# Patient Record
Sex: MALE | Race: WHITE | NOT HISPANIC OR LATINO | Employment: OTHER | URBAN - METROPOLITAN AREA
[De-identification: names, ages, dates, MRNs, and addresses within clinical notes are randomized per-mention and may not be internally consistent; named-entity substitution may affect disease eponyms.]

---

## 2020-07-31 DIAGNOSIS — R10.9 PAIN, ABDOMINAL, NONSPECIFIC: Primary | ICD-10-CM

## 2020-08-07 ENCOUNTER — HOSPITAL ENCOUNTER (OUTPATIENT)
Dept: RADIOLOGY | Facility: HOSPITAL | Age: 60
Discharge: HOME | End: 2020-08-07
Attending: FAMILY MEDICINE
Payer: COMMERCIAL

## 2020-08-07 DIAGNOSIS — R10.9 PAIN, ABDOMINAL, NONSPECIFIC: ICD-10-CM

## 2020-08-07 PROCEDURE — 76700 US EXAM ABDOM COMPLETE: CPT | Mod: TC,PO

## 2020-08-07 PROCEDURE — 74019 RADEX ABDOMEN 2 VIEWS: CPT | Mod: TC,PO

## 2020-08-18 ENCOUNTER — LAB VISIT (OUTPATIENT)
Dept: LAB | Facility: HOSPITAL | Age: 60
End: 2020-08-18
Attending: INTERNAL MEDICINE
Payer: COMMERCIAL

## 2020-08-18 DIAGNOSIS — K80.20 BILIARY CALCULUS: Primary | ICD-10-CM

## 2020-08-18 LAB
ALBUMIN SERPL BCP-MCNC: 4.4 G/DL (ref 3.5–5.2)
ALP SERPL-CCNC: 44 U/L (ref 55–135)
ALT SERPL W/O P-5'-P-CCNC: 38 U/L (ref 10–44)
ANION GAP SERPL CALC-SCNC: 11 MMOL/L (ref 8–16)
AST SERPL-CCNC: 31 U/L (ref 10–40)
BILIRUB SERPL-MCNC: 1 MG/DL (ref 0.1–1)
BUN SERPL-MCNC: 13 MG/DL (ref 8–23)
CALCIUM SERPL-MCNC: 10 MG/DL (ref 8.7–10.5)
CHLORIDE SERPL-SCNC: 97 MMOL/L (ref 95–110)
CO2 SERPL-SCNC: 30 MMOL/L (ref 23–29)
CREAT SERPL-MCNC: 0.9 MG/DL (ref 0.5–1.4)
EST. GFR  (AFRICAN AMERICAN): >60 ML/MIN/1.73 M^2
EST. GFR  (NON AFRICAN AMERICAN): >60 ML/MIN/1.73 M^2
GLUCOSE SERPL-MCNC: 108 MG/DL (ref 70–110)
POTASSIUM SERPL-SCNC: 3.6 MMOL/L (ref 3.5–5.1)
PROT SERPL-MCNC: 7.2 G/DL (ref 6–8.4)
SODIUM SERPL-SCNC: 138 MMOL/L (ref 136–145)

## 2020-08-18 PROCEDURE — 80053 COMPREHEN METABOLIC PANEL: CPT

## 2020-08-18 PROCEDURE — 36415 COLL VENOUS BLD VENIPUNCTURE: CPT

## 2021-03-23 DIAGNOSIS — R07.89 OTHER CHEST PAIN: ICD-10-CM

## 2021-03-23 DIAGNOSIS — I83.91 ASYMPTOMATIC VARICOSE VEINS OF RIGHT LOWER EXTREMITY: ICD-10-CM

## 2021-03-23 DIAGNOSIS — R10.9 STOMACH ACHE: Primary | ICD-10-CM

## 2021-04-07 ENCOUNTER — HOSPITAL ENCOUNTER (OUTPATIENT)
Dept: RADIOLOGY | Facility: HOSPITAL | Age: 61
Discharge: HOME | End: 2021-04-07
Attending: NURSE PRACTITIONER
Payer: COMMERCIAL

## 2021-04-07 DIAGNOSIS — R07.89 OTHER CHEST PAIN: ICD-10-CM

## 2021-04-07 DIAGNOSIS — I83.91 ASYMPTOMATIC VARICOSE VEINS OF RIGHT LOWER EXTREMITY: ICD-10-CM

## 2021-04-07 DIAGNOSIS — R10.9 STOMACH ACHE: ICD-10-CM

## 2021-04-07 PROCEDURE — 76700 US EXAM ABDOM COMPLETE: CPT | Mod: TC,PO

## 2021-04-07 PROCEDURE — 71046 X-RAY EXAM CHEST 2 VIEWS: CPT | Mod: TC,PO

## 2021-04-07 PROCEDURE — 93971 EXTREMITY STUDY: CPT | Mod: TC,PO,RT

## 2021-05-06 ENCOUNTER — LAB VISIT (OUTPATIENT)
Dept: LAB | Facility: HOSPITAL | Age: 61
End: 2021-05-06
Attending: INTERNAL MEDICINE
Payer: COMMERCIAL

## 2021-05-06 DIAGNOSIS — A09 INFECTIOUS COLITIS, ENTERITIS, AND GASTROENTERITIS: Primary | ICD-10-CM

## 2021-05-06 DIAGNOSIS — K58.0 IRRITABLE BOWEL SYNDROME WITH DIARRHEA: ICD-10-CM

## 2021-05-06 DIAGNOSIS — R10.9 ABDOMINAL PAIN OF UNKNOWN CAUSE: Primary | ICD-10-CM

## 2021-05-06 LAB
C DIFF GDH STL QL: NEGATIVE
C DIFF TOX A+B STL QL IA: NEGATIVE
RV AG STL QL IA.RAPID: NEGATIVE

## 2021-05-06 PROCEDURE — 87045 FECES CULTURE AEROBIC BACT: CPT | Performed by: INTERNAL MEDICINE

## 2021-05-06 PROCEDURE — 87425 ROTAVIRUS AG IA: CPT | Performed by: INTERNAL MEDICINE

## 2021-05-06 PROCEDURE — 36415 COLL VENOUS BLD VENIPUNCTURE: CPT | Performed by: INTERNAL MEDICINE

## 2021-05-06 PROCEDURE — 87798 DETECT AGENT NOS DNA AMP: CPT | Performed by: INTERNAL MEDICINE

## 2021-05-06 PROCEDURE — 87046 STOOL CULTR AEROBIC BACT EA: CPT | Performed by: INTERNAL MEDICINE

## 2021-05-06 PROCEDURE — 87324 CLOSTRIDIUM AG IA: CPT | Performed by: INTERNAL MEDICINE

## 2021-05-06 PROCEDURE — 30000890 LABCORP MISCELLANEOUS TEST: Performed by: INTERNAL MEDICINE

## 2021-05-06 PROCEDURE — 87328 CRYPTOSPORIDIUM AG IA: CPT | Performed by: INTERNAL MEDICINE

## 2021-05-06 PROCEDURE — 82656 EL-1 FECAL QUAL/SEMIQ: CPT | Performed by: INTERNAL MEDICINE

## 2021-05-06 PROCEDURE — 87449 NOS EACH ORGANISM AG IA: CPT | Performed by: INTERNAL MEDICINE

## 2021-05-06 PROCEDURE — 87209 SMEAR COMPLEX STAIN: CPT | Performed by: INTERNAL MEDICINE

## 2021-05-08 LAB
CRYPTOSP AG STL QL IA: NEGATIVE
STOOL CULTURE: NORMAL
STOOL CULTURE: NORMAL

## 2021-05-10 LAB — ELASTASE PANC STL-MCNT: 234 UG ELAST./G

## 2021-05-14 LAB
LABCORP MISC TEST CODE: NORMAL
LABCORP MISC TEST NAME: NORMAL
LABCORP MISCELLANEOUS TEST: NORMAL

## 2021-05-17 ENCOUNTER — HOSPITAL ENCOUNTER (OUTPATIENT)
Dept: RADIOLOGY | Facility: HOSPITAL | Age: 61
Discharge: HOME | End: 2021-05-17
Attending: INTERNAL MEDICINE
Payer: COMMERCIAL

## 2021-05-17 DIAGNOSIS — R10.9 ABDOMINAL PAIN OF UNKNOWN CAUSE: ICD-10-CM

## 2021-05-17 LAB
CREAT SERPL-MCNC: 0.8 MG/DL (ref 0.5–1.4)
SAMPLE: NORMAL

## 2021-05-17 PROCEDURE — 74177 CT ABD & PELVIS W/CONTRAST: CPT | Mod: TC,PO

## 2021-05-17 PROCEDURE — 25500020 PHARM REV CODE 255: Mod: PO | Performed by: INTERNAL MEDICINE

## 2021-05-17 RX ADMIN — IOHEXOL 100 ML: 350 INJECTION, SOLUTION INTRAVENOUS at 10:05

## 2023-01-09 LAB — PSA: 4.7

## 2023-07-24 ENCOUNTER — OFFICE VISIT (OUTPATIENT)
Dept: FAMILY MEDICINE | Facility: CLINIC | Age: 63
End: 2023-07-24
Payer: COMMERCIAL

## 2023-07-24 VITALS
OXYGEN SATURATION: 97 % | TEMPERATURE: 98 F | SYSTOLIC BLOOD PRESSURE: 132 MMHG | HEART RATE: 65 BPM | WEIGHT: 295.44 LBS | DIASTOLIC BLOOD PRESSURE: 82 MMHG | BODY MASS INDEX: 41.36 KG/M2 | RESPIRATION RATE: 18 BRPM | HEIGHT: 71 IN

## 2023-07-24 DIAGNOSIS — M25.561 ACUTE PAIN OF RIGHT KNEE: ICD-10-CM

## 2023-07-24 DIAGNOSIS — I10 ESSENTIAL (PRIMARY) HYPERTENSION: Primary | ICD-10-CM

## 2023-07-24 DIAGNOSIS — Z13.1 SCREENING FOR DIABETES MELLITUS: ICD-10-CM

## 2023-07-24 DIAGNOSIS — N40.0 BENIGN PROSTATIC HYPERPLASIA, UNSPECIFIED WHETHER LOWER URINARY TRACT SYMPTOMS PRESENT: ICD-10-CM

## 2023-07-24 DIAGNOSIS — Z13.220 SCREENING FOR HYPERLIPIDEMIA: ICD-10-CM

## 2023-07-24 DIAGNOSIS — R53.83 FATIGUE, UNSPECIFIED TYPE: ICD-10-CM

## 2023-07-24 PROCEDURE — 1159F MED LIST DOCD IN RCRD: CPT | Mod: CPTII,S$GLB,,

## 2023-07-24 PROCEDURE — 3079F PR MOST RECENT DIASTOLIC BLOOD PRESSURE 80-89 MM HG: ICD-10-PCS | Mod: CPTII,S$GLB,,

## 2023-07-24 PROCEDURE — 99204 PR OFFICE/OUTPT VISIT, NEW, LEVL IV, 45-59 MIN: ICD-10-PCS | Mod: S$GLB,,,

## 2023-07-24 PROCEDURE — 1159F PR MEDICATION LIST DOCUMENTED IN MEDICAL RECORD: ICD-10-PCS | Mod: CPTII,S$GLB,,

## 2023-07-24 PROCEDURE — 3075F SYST BP GE 130 - 139MM HG: CPT | Mod: CPTII,S$GLB,,

## 2023-07-24 PROCEDURE — 3075F PR MOST RECENT SYSTOLIC BLOOD PRESS GE 130-139MM HG: ICD-10-PCS | Mod: CPTII,S$GLB,,

## 2023-07-24 PROCEDURE — 3008F BODY MASS INDEX DOCD: CPT | Mod: CPTII,S$GLB,,

## 2023-07-24 PROCEDURE — 3008F PR BODY MASS INDEX (BMI) DOCUMENTED: ICD-10-PCS | Mod: CPTII,S$GLB,,

## 2023-07-24 PROCEDURE — 99204 OFFICE O/P NEW MOD 45 MIN: CPT | Mod: S$GLB,,,

## 2023-07-24 PROCEDURE — 3079F DIAST BP 80-89 MM HG: CPT | Mod: CPTII,S$GLB,,

## 2023-07-24 RX ORDER — TAMSULOSIN HYDROCHLORIDE 0.4 MG/1
1 CAPSULE ORAL
COMMUNITY
Start: 2023-05-18

## 2023-07-24 RX ORDER — AMLODIPINE BESYLATE 10 MG/1
10 TABLET ORAL
COMMUNITY
Start: 2023-03-08

## 2023-07-24 RX ORDER — HYDROCHLOROTHIAZIDE 25 MG/1
25 TABLET ORAL
COMMUNITY
Start: 2023-06-29

## 2023-07-24 NOTE — Clinical Note
Please request colonoscopy results from Dr. Sneed.  Also request copy of PSA results from Dr. Pemberton

## 2023-07-24 NOTE — PROGRESS NOTES
Subjective:       Patient ID: Grupo Givens is a 62 y.o. male.    Chief Complaint: Establish Care and Knee Pain    Grupo Givens is a 62 y.o. male patient who is new to me that presents to clinic to establish care. He complaints of right knee pain after sitting in a chair for a long period of time.  Pain feels like it is behind his knee and has a deep ache.  If he is in the car for a couple of hours he has significant pain in his right knee.  He is taking over-the-counter medications with not much relief.  He has no known injury.    PMHx  Hypertension:  With fair control taking amlodipine 10 mg and hydrochlorothiazide 25 mg daily.  He is compliant with medications.  Denies chest pain or shortness a breath.  BPH:  Taking Flomax daily.  Followed by Dr. Li.  He states he is had an elevated PSA that will be repeated by Dr. Li in a couple of months.    Surgical history   Tonsillectomy and adenoidectomy    Family history   Mother () heart disease (MI age 73), depression   Father (alive) valvular heart disease, Alzheimer's disease  Maternal grandmother () heart disease  Maternal grandfather () cancer, alcohol abuse, heart disease  Paternal grandmother () Alzheimer's disease    Social  Tobacco:  Former smoker, half pack per day for 20 years.  Ten pack years.  ETOH:  Patient drinks approximately 6 glasses of wine per night, 7 days a week  Smokes marijuana  , 3 children  Career:  Owner of amusement company    Review of patient's allergies indicates:   Allergen Reactions    Lisinopril Other (See Comments)     Social Determinants of Health     Tobacco Use: Medium Risk    Smoking Tobacco Use: Former    Smokeless Tobacco Use: Never    Passive Exposure: Not on file   Alcohol Use: Not on file   Financial Resource Strain: Not on file   Food Insecurity: Not on file   Transportation Needs: Not on file   Physical Activity: Not on file   Stress: Not on file   Social  Connections: Not on file   Housing Stability: Not on file   Depression: Low Risk     Last PHQ-4: Flowsheet Data: 0      Past Medical History:   Diagnosis Date    BPH (benign prostatic hyperplasia)     Essential (primary) hypertension       Past Surgical History:   Procedure Laterality Date    ADENOIDECTOMY      TONSILLECTOMY        Social History     Socioeconomic History    Marital status:      Spouse name: Brandie    Number of children: 3         Current Outpatient Medications:     amLODIPine (NORVASC) 10 MG tablet, Take 10 mg by mouth., Disp: , Rfl:     hydroCHLOROthiazide (HYDRODIURIL) 25 MG tablet, Take 25 mg by mouth., Disp: , Rfl:     tamsulosin (FLOMAX) 0.4 mg Cap, Take 1 capsule by mouth., Disp: , Rfl:     Lab Results   Component Value Date    WBC 5.48 05/27/2004    HGB 16.3 05/27/2004    HCT 49.1 05/27/2004     05/27/2004    CHOL 189 05/27/2004    TRIG 48 05/27/2004    HDL 82.0 (H) 05/27/2004    ALT 38 08/18/2020    AST 31 08/18/2020     08/18/2020    K 3.6 08/18/2020    CL 97 08/18/2020    CREATININE 0.9 08/18/2020    BUN 13 08/18/2020    CO2 30 (H) 08/18/2020    TSH 4.4 (H) 05/27/2004       Review of Systems   Constitutional:  Negative for chills and fever.   Respiratory:  Negative for chest tightness and shortness of breath.    Cardiovascular:  Negative for chest pain, palpitations and leg swelling.   Genitourinary:  Negative for decreased urine volume, dysuria, frequency and urgency.   Musculoskeletal:  Positive for arthralgias.     Objective:      Physical Exam  Constitutional:       Appearance: Normal appearance.   HENT:      Head: Normocephalic and atraumatic.      Right Ear: Tympanic membrane normal.      Left Ear: Tympanic membrane normal.      Nose: Nose normal.      Mouth/Throat:      Mouth: Mucous membranes are moist.   Eyes:      Pupils: Pupils are equal, round, and reactive to light.   Cardiovascular:      Rate and Rhythm: Normal rate and regular rhythm.      Pulses: Normal  pulses.           Radial pulses are 2+ on the right side and 2+ on the left side.        Dorsalis pedis pulses are 2+ on the right side and 2+ on the left side.      Heart sounds: Normal heart sounds, S1 normal and S2 normal.   Pulmonary:      Effort: Pulmonary effort is normal.      Breath sounds: Normal breath sounds.   Abdominal:      General: Abdomen is flat. Bowel sounds are normal.      Palpations: Abdomen is soft.      Tenderness: There is no abdominal tenderness.   Musculoskeletal:         General: Normal range of motion.      Cervical back: Normal range of motion and neck supple.      Right lower leg: No edema.      Left lower leg: No edema.   Lymphadenopathy:      Cervical: No cervical adenopathy.   Skin:     General: Skin is warm and dry.   Neurological:      Mental Status: He is alert and oriented to person, place, and time.   Psychiatric:         Attention and Perception: Attention normal.         Mood and Affect: Mood normal.         Speech: Speech normal.         Behavior: Behavior normal. Behavior is cooperative.         Thought Content: Thought content normal.         Judgment: Judgment normal.       Assessment:       1. Essential (primary) hypertension    2. Benign prostatic hyperplasia, unspecified whether lower urinary tract symptoms present    3. Acute pain of right knee    4. Screening for hyperlipidemia    5. Fatigue, unspecified type    6. Screening for diabetes mellitus        Plan:       Grupo was seen today for establish care and knee pain.    Diagnoses and all orders for this visit:    Essential (primary) hypertension  -     CBC Auto Differential; Future  -     Comprehensive Metabolic Panel; Future  -     CBC Auto Differential  -     Comprehensive Metabolic Panel    Benign prostatic hyperplasia, unspecified whether lower urinary tract symptoms present    Acute pain of right knee  -     X-Ray Knee 3 View Right; Future    Screening for hyperlipidemia  -     Lipid Panel; Future  -     Lipid  Panel    Fatigue, unspecified type  -     TSH; Future  -     TSH    Screening for diabetes mellitus  -     Hemoglobin A1C; Future  -     Hemoglobin A1C    Have x-ray of right knee today.  Depending on results of x-ray consider steroid treatment.  Also consider referral to ortho.   Have fasting labs.    Hypertension with fair control, continue current medications.  BPH:  Follow-up with Dr. Li as recommended.  We will request copy of recent PSA.  We will request colonoscopy results.  Routine follow-up in 6 months or sooner if needed.

## 2023-07-25 ENCOUNTER — HOSPITAL ENCOUNTER (OUTPATIENT)
Dept: RADIOLOGY | Facility: HOSPITAL | Age: 63
Discharge: HOME | End: 2023-07-25
Payer: COMMERCIAL

## 2023-07-25 ENCOUNTER — PATIENT OUTREACH (OUTPATIENT)
Dept: ADMINISTRATIVE | Facility: HOSPITAL | Age: 63
End: 2023-07-25
Payer: COMMERCIAL

## 2023-07-25 DIAGNOSIS — M25.561 ACUTE PAIN OF RIGHT KNEE: ICD-10-CM

## 2023-07-25 PROCEDURE — 73562 X-RAY EXAM OF KNEE 3: CPT | Mod: TC,PO,RT

## 2023-07-25 NOTE — LETTER
AUTHORIZATION FOR RELEASE OF   CONFIDENTIAL INFORMATION    Dear Dr Cortes,    We are seeing Grupo Givens, date of birth 1960, in the clinic at [facility]. Dr Melendez is the patient's PCP. Grupo Givens has an outstanding lab/procedure at the time we reviewed his chart. In order to help keep his health information updated, he has authorized us to request the following medical record(s):        (  )  MAMMOGRAM                                      ( X )  COLONOSCOPY      (  )  PAP SMEAR                                          (  )  OUTSIDE LAB RESULTS     (  )  DEXA SCAN                                          (  )  EYE EXAM            (  )  FOOT EXAM                                          (  )  ENTIRE RECORD     (  )  OUTSIDE IMMUNIZATIONS                 (  )  _______________         Please fax records to [facility], Dr Melendez, at [phone]     Thanks so much and have a great day!    [place] LPN [facility]  [address] [place] [address]  - [phone]  - [phone]            Patient Name: Grupo Givens  : 1960  Patient Phone #: [phone]

## 2023-07-26 ENCOUNTER — TELEPHONE (OUTPATIENT)
Dept: FAMILY MEDICINE | Facility: CLINIC | Age: 63
End: 2023-07-26
Payer: COMMERCIAL

## 2023-07-26 LAB
ALBUMIN SERPL-MCNC: 4.4 G/DL (ref 3.9–4.9)
ALBUMIN/GLOB SERPL: 1.8 {RATIO} (ref 1.2–2.2)
ALP SERPL-CCNC: 47 IU/L (ref 44–121)
ALT SERPL-CCNC: 16 IU/L (ref 0–44)
AST SERPL-CCNC: 20 IU/L (ref 0–40)
BASOPHILS # BLD AUTO: 0 X10E3/UL (ref 0–0.2)
BASOPHILS NFR BLD AUTO: 1 %
BILIRUB SERPL-MCNC: 0.6 MG/DL (ref 0–1.2)
BUN SERPL-MCNC: 11 MG/DL (ref 8–27)
BUN/CREAT SERPL: 15 (ref 10–24)
CALCIUM SERPL-MCNC: 10.1 MG/DL (ref 8.6–10.2)
CHLORIDE SERPL-SCNC: 96 MMOL/L (ref 96–106)
CHOLEST SERPL-MCNC: 188 MG/DL (ref 100–199)
CO2 SERPL-SCNC: 33 MMOL/L (ref 20–29)
CREAT SERPL-MCNC: 0.74 MG/DL (ref 0.76–1.27)
EOSINOPHIL # BLD AUTO: 0.2 X10E3/UL (ref 0–0.4)
EOSINOPHIL NFR BLD AUTO: 4 %
ERYTHROCYTE [DISTWIDTH] IN BLOOD BY AUTOMATED COUNT: 12.6 % (ref 11.6–15.4)
EST. GFR  (NO RACE VARIABLE): 101 ML/MIN/1.73
GLOBULIN SER CALC-MCNC: 2.4 G/DL (ref 1.5–4.5)
GLUCOSE SERPL-MCNC: 106 MG/DL (ref 70–99)
HBA1C MFR BLD: 5.2 % (ref 4.8–5.6)
HCT VFR BLD AUTO: 44.3 % (ref 37.5–51)
HDLC SERPL-MCNC: 73 MG/DL
HGB BLD-MCNC: 15.2 G/DL (ref 13–17.7)
IMM GRANULOCYTES # BLD AUTO: 0 X10E3/UL (ref 0–0.1)
IMM GRANULOCYTES NFR BLD AUTO: 1 %
LDLC SERPL CALC-MCNC: 105 MG/DL (ref 0–99)
LYMPHOCYTES # BLD AUTO: 1.2 X10E3/UL (ref 0.7–3.1)
LYMPHOCYTES NFR BLD AUTO: 22 %
MCH RBC QN AUTO: 33.5 PG (ref 26.6–33)
MCHC RBC AUTO-ENTMCNC: 34.3 G/DL (ref 31.5–35.7)
MCV RBC AUTO: 98 FL (ref 79–97)
MONOCYTES # BLD AUTO: 0.7 X10E3/UL (ref 0.1–0.9)
MONOCYTES NFR BLD AUTO: 12 %
NEUTROPHILS # BLD AUTO: 3.4 X10E3/UL (ref 1.4–7)
NEUTROPHILS NFR BLD AUTO: 60 %
PLATELET # BLD AUTO: 188 X10E3/UL (ref 150–450)
POTASSIUM SERPL-SCNC: 3.8 MMOL/L (ref 3.5–5.2)
PROT SERPL-MCNC: 6.8 G/DL (ref 6–8.5)
RBC # BLD AUTO: 4.54 X10E6/UL (ref 4.14–5.8)
SODIUM SERPL-SCNC: 136 MMOL/L (ref 134–144)
TRIGL SERPL-MCNC: 49 MG/DL (ref 0–149)
TSH SERPL DL<=0.005 MIU/L-ACNC: 3.33 UIU/ML (ref 0.45–4.5)
VLDLC SERPL CALC-MCNC: 10 MG/DL (ref 5–40)
WBC # BLD AUTO: 5.5 X10E3/UL (ref 3.4–10.8)

## 2023-07-28 ENCOUNTER — TELEPHONE (OUTPATIENT)
Dept: FAMILY MEDICINE | Facility: CLINIC | Age: 63
End: 2023-07-28
Payer: COMMERCIAL

## 2023-07-28 NOTE — TELEPHONE ENCOUNTER
----- Message from Justine Drummond NP sent at 7/28/2023  2:16 PM CDT -----  Glucose is a little elevated. Remaining labs are good. Follow up as recommended.

## 2023-08-25 ENCOUNTER — OFFICE VISIT (OUTPATIENT)
Dept: ORTHOPEDICS | Facility: CLINIC | Age: 63
End: 2023-08-25
Payer: MEDICARE

## 2023-08-25 VITALS — WEIGHT: 295.44 LBS | HEIGHT: 71 IN | BODY MASS INDEX: 41.36 KG/M2

## 2023-08-25 DIAGNOSIS — M17.0 PRIMARY OSTEOARTHRITIS OF BOTH KNEES: Primary | ICD-10-CM

## 2023-08-25 PROCEDURE — 99999 PR PBB SHADOW E&M-EST. PATIENT-LVL II: CPT | Mod: PBBFAC,HCNC,, | Performed by: ORTHOPAEDIC SURGERY

## 2023-08-25 PROCEDURE — 99999 PR PBB SHADOW E&M-EST. PATIENT-LVL II: ICD-10-PCS | Mod: PBBFAC,HCNC,, | Performed by: ORTHOPAEDIC SURGERY

## 2023-08-25 PROCEDURE — 1159F PR MEDICATION LIST DOCUMENTED IN MEDICAL RECORD: ICD-10-PCS | Mod: HCNC,CPTII,S$GLB, | Performed by: ORTHOPAEDIC SURGERY

## 2023-08-25 PROCEDURE — 1160F PR REVIEW ALL MEDS BY PRESCRIBER/CLIN PHARMACIST DOCUMENTED: ICD-10-PCS | Mod: HCNC,CPTII,S$GLB, | Performed by: ORTHOPAEDIC SURGERY

## 2023-08-25 PROCEDURE — 1159F MED LIST DOCD IN RCRD: CPT | Mod: HCNC,CPTII,S$GLB, | Performed by: ORTHOPAEDIC SURGERY

## 2023-08-25 PROCEDURE — 99204 OFFICE O/P NEW MOD 45 MIN: CPT | Mod: HCNC,S$GLB,, | Performed by: ORTHOPAEDIC SURGERY

## 2023-08-25 PROCEDURE — 99204 PR OFFICE/OUTPT VISIT, NEW, LEVL IV, 45-59 MIN: ICD-10-PCS | Mod: HCNC,S$GLB,, | Performed by: ORTHOPAEDIC SURGERY

## 2023-08-25 PROCEDURE — 3044F HG A1C LEVEL LT 7.0%: CPT | Mod: HCNC,CPTII,S$GLB, | Performed by: ORTHOPAEDIC SURGERY

## 2023-08-25 PROCEDURE — 3044F PR MOST RECENT HEMOGLOBIN A1C LEVEL <7.0%: ICD-10-PCS | Mod: HCNC,CPTII,S$GLB, | Performed by: ORTHOPAEDIC SURGERY

## 2023-08-25 PROCEDURE — 3008F PR BODY MASS INDEX (BMI) DOCUMENTED: ICD-10-PCS | Mod: HCNC,CPTII,S$GLB, | Performed by: ORTHOPAEDIC SURGERY

## 2023-08-25 PROCEDURE — 1160F RVW MEDS BY RX/DR IN RCRD: CPT | Mod: HCNC,CPTII,S$GLB, | Performed by: ORTHOPAEDIC SURGERY

## 2023-08-25 PROCEDURE — 3008F BODY MASS INDEX DOCD: CPT | Mod: HCNC,CPTII,S$GLB, | Performed by: ORTHOPAEDIC SURGERY

## 2023-08-25 RX ORDER — MELOXICAM 15 MG/1
15 TABLET ORAL DAILY
Qty: 30 TABLET | Refills: 1 | Status: SHIPPED | OUTPATIENT
Start: 2023-08-25

## 2023-08-25 NOTE — PROGRESS NOTES
Subjective:      Patient ID: Grupo Givens is a 62 y.o. male.    Chief Complaint: No chief complaint on file.    HPI  62-year-old male with a history of right knee pain approximately 4 months ago was playing darts and was doing lots of stooping squatting bending picking the darts up there was no particular trauma but the next day had some tightness and soreness in his knee.  More recently simply lifted his leg to step over some drop cords and had increased pain in his knee.  It has a bit with overnight rest and elevation.  He is had no specific treatment for this otherwise.  ROS      Objective:    Ortho Exam     Constitutional:   Patient is alert  and oriented in no acute distress  HEENT:  normocephalic atraumatic; PERRL EOMI  Neck:  Supple without adenopathy  Cardiovascular:  Normal rate and rhythm  Pulmonary:  Normal respiratory effort normal chest wall expansion  Abdominal:  Nonprotuberant nondistended  Musculoskeletal:  He has a mildly antalgic gait  He has significant varicosities of both lower extremities he has a trace effusion primarily  Posterior joint line tenderness he has difficulty with flexion beyond 90° today.  He has good active extension  Neurological:  No focal defect; cranial nerves 2-12 grossly intact  Psychiatric/behavioral:  Mood and behavior normal      X-Ray Knee 3 View Right  Reason: PAIN, NO INJURY    FINDINGS:  3 views of right knee show no acute fracture or dislocation. Mild osteoarthrosis affects the patellofemoral compartment. No medial or lateral compartment joint space narrowing. Dictated ossicle lies posteriorly within the right knee, nonspecific. Negative for joint effusion. Mild arterial vascular calcifications are present.    IMPRESSION:  Mild patellofemoral compartment osteoarthrosis with no acute abnormality of right knee.    Electronically signed by:  Alex Ulrich MD  7/25/2023 10:54 AM CDT Workstation: [id number]       My Radiographs Findings:    I have personally  reviewed radiographs and concur with above findings    Assessment:       Encounter Diagnosis   Name Primary?    Primary osteoarthritis of both knees Yes         Plan:       I have discussed medical condition and treatment options him at length.  He is content with a trial of conservative treatment to include home rehab exercises ice elevation compressive wrapping and anti-inflammatory medicines.  GI cardiac and renal precautions were discussed.  Follow up me in 4-6 weeks if symptoms fail to improve.        Past Medical History:   Diagnosis Date    BPH (benign prostatic hyperplasia)     Essential (primary) hypertension      Past Surgical History:   Procedure Laterality Date    ADENOIDECTOMY      TONSILLECTOMY           Current Outpatient Medications:     amLODIPine (NORVASC) 10 MG tablet, Take 10 mg by mouth., Disp: , Rfl:     hydroCHLOROthiazide (HYDRODIURIL) 25 MG tablet, Take 25 mg by mouth., Disp: , Rfl:     tamsulosin (FLOMAX) 0.4 mg Cap, Take 1 capsule by mouth., Disp: , Rfl:     Review of patient's allergies indicates:   Allergen Reactions    Lisinopril Other (See Comments)       Family History   Problem Relation Age of Onset    Heart disease Mother 73        MI    Depression Mother     Heart disease Father         valve disease    Alzheimer's disease Father     Heart disease Maternal Grandmother         enlarged heart    Cancer Maternal Grandfather     Alcohol abuse Maternal Grandfather     Heart disease Maternal Grandfather     Alzheimer's disease Paternal Grandmother      Social History     Occupational History    Occupation: Owner of [employer]   Tobacco Use    Smoking status: Former     Current packs/day: 0.00     Average packs/day: 0.5 packs/day for 20.0 years (10.0 ttl pk-yrs)     Types: Cigarettes     Start date: 1980     Quit date: 2000     Years since quittin.2    Smokeless tobacco: Never   Substance and Sexual Activity    Alcohol use: Yes     Alcohol/week: 42.0 standard drinks of  alcohol     Types: 42 Glasses of wine per week    Drug use: Yes     Types: Marijuana    Sexual activity: Yes     Partners: Female

## 2023-12-07 ENCOUNTER — DOCUMENTATION ONLY (OUTPATIENT)
Dept: ORTHOPEDICS | Facility: CLINIC | Age: 63
End: 2023-12-07
Payer: MEDICARE

## 2023-12-07 NOTE — PROGRESS NOTES
[facility] prescription refill request was successfully faxed to [phone].    Batched to medical records.

## 2024-04-18 ENCOUNTER — OFFICE VISIT (OUTPATIENT)
Dept: FAMILY MEDICINE | Facility: CLINIC | Age: 64
End: 2024-04-18
Payer: MEDICARE

## 2024-04-18 VITALS
TEMPERATURE: 98 F | SYSTOLIC BLOOD PRESSURE: 138 MMHG | HEIGHT: 71 IN | OXYGEN SATURATION: 96 % | HEART RATE: 63 BPM | BODY MASS INDEX: 39.62 KG/M2 | WEIGHT: 283 LBS | RESPIRATION RATE: 18 BRPM | DIASTOLIC BLOOD PRESSURE: 86 MMHG

## 2024-04-18 DIAGNOSIS — Z13.6 SCREENING FOR AAA (ABDOMINAL AORTIC ANEURYSM): ICD-10-CM

## 2024-04-18 DIAGNOSIS — Z00.00 PHYSICAL EXAM: ICD-10-CM

## 2024-04-18 DIAGNOSIS — N40.0 BENIGN PROSTATIC HYPERPLASIA, UNSPECIFIED WHETHER LOWER URINARY TRACT SYMPTOMS PRESENT: ICD-10-CM

## 2024-04-18 DIAGNOSIS — K63.5 POLYP OF COLON, UNSPECIFIED PART OF COLON, UNSPECIFIED TYPE: ICD-10-CM

## 2024-04-18 DIAGNOSIS — Z87.891 FORMER SMOKER: ICD-10-CM

## 2024-04-18 DIAGNOSIS — I10 HYPERTENSION, ESSENTIAL: ICD-10-CM

## 2024-04-18 DIAGNOSIS — E66.01 CLASS 3 OBESITY: Primary | ICD-10-CM

## 2024-04-18 DIAGNOSIS — Z90.89 S/P TONSILLECTOMY: ICD-10-CM

## 2024-04-18 DIAGNOSIS — Z86.718 HISTORY OF DVT (DEEP VEIN THROMBOSIS): ICD-10-CM

## 2024-04-18 PROBLEM — E66.813 CLASS 3 OBESITY: Status: ACTIVE | Noted: 2024-04-18

## 2024-04-18 PROCEDURE — 3079F DIAST BP 80-89 MM HG: CPT | Mod: HCNC,CPTII,S$GLB, | Performed by: FAMILY MEDICINE

## 2024-04-18 PROCEDURE — 1126F AMNT PAIN NOTED NONE PRSNT: CPT | Mod: HCNC,CPTII,S$GLB, | Performed by: FAMILY MEDICINE

## 2024-04-18 PROCEDURE — 1160F RVW MEDS BY RX/DR IN RCRD: CPT | Mod: HCNC,CPTII,S$GLB, | Performed by: FAMILY MEDICINE

## 2024-04-18 PROCEDURE — 99397 PER PM REEVAL EST PAT 65+ YR: CPT | Mod: HCNC,S$GLB,, | Performed by: FAMILY MEDICINE

## 2024-04-18 PROCEDURE — 3075F SYST BP GE 130 - 139MM HG: CPT | Mod: HCNC,CPTII,S$GLB, | Performed by: FAMILY MEDICINE

## 2024-04-18 PROCEDURE — 1159F MED LIST DOCD IN RCRD: CPT | Mod: HCNC,CPTII,S$GLB, | Performed by: FAMILY MEDICINE

## 2024-04-18 PROCEDURE — 3288F FALL RISK ASSESSMENT DOCD: CPT | Mod: HCNC,CPTII,S$GLB, | Performed by: FAMILY MEDICINE

## 2024-04-18 PROCEDURE — 99999 PR PBB SHADOW E&M-EST. PATIENT-LVL IV: CPT | Mod: PBBFAC,HCNC,, | Performed by: FAMILY MEDICINE

## 2024-04-18 PROCEDURE — 1101F PT FALLS ASSESS-DOCD LE1/YR: CPT | Mod: HCNC,CPTII,S$GLB, | Performed by: FAMILY MEDICINE

## 2024-04-18 PROCEDURE — 3008F BODY MASS INDEX DOCD: CPT | Mod: HCNC,CPTII,S$GLB, | Performed by: FAMILY MEDICINE

## 2024-04-18 RX ORDER — HYDROCHLOROTHIAZIDE 25 MG/1
25 TABLET ORAL DAILY
Qty: 90 TABLET | Refills: 1 | Status: SHIPPED | OUTPATIENT
Start: 2024-04-18

## 2024-04-18 RX ORDER — AMLODIPINE BESYLATE 10 MG/1
10 TABLET ORAL DAILY
Qty: 90 TABLET | Refills: 1 | Status: SHIPPED | OUTPATIENT
Start: 2024-04-18

## 2024-04-20 PROBLEM — Z86.718 HISTORY OF DVT (DEEP VEIN THROMBOSIS): Status: ACTIVE | Noted: 2024-04-20

## 2024-04-20 PROBLEM — Z90.89 S/P TONSILLECTOMY: Status: ACTIVE | Noted: 2024-04-20

## 2024-04-20 PROBLEM — K63.5 POLYP OF COLON: Status: ACTIVE | Noted: 2024-04-20

## 2024-04-20 PROBLEM — Z87.891 FORMER SMOKER: Status: ACTIVE | Noted: 2024-04-20

## 2024-04-21 NOTE — PROGRESS NOTES
Subjective:       Patient ID: Grupo Givens is a 63 y.o. male.    Chief Complaint: Annual Exam and Establish Care    Here for new patient visit.      Social history quit smoking some 20 years ago.  Alcohol wine daily.  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      Family history mother MI.  Maternal grandfather MI and alcoholism.  Father age 89 healthy other than some dementia.      Immunizations declines all.    Past medical history.  Colon polyps.  Former smoker.  BPH.  Biopsy done no cancer.  PSA is current with  In each min.  5.1.  Hypertension controlled normally.  Out of medications.  BMI of 39.  History of DVT of the right thigh 24 years ago.  Prior tonsillectomy.        Review of Systems   Constitutional: Negative.    HENT: Negative.     Eyes: Negative.    Respiratory: Negative.     Cardiovascular: Negative.    Gastrointestinal: Negative.    Endocrine: Negative.    Genitourinary: Negative.    Musculoskeletal: Negative.    Skin: Negative.    Neurological: Negative.    Hematological: Negative.    Psychiatric/Behavioral: Negative.         Objective:      Physical Exam  Vitals reviewed.   Constitutional:       Appearance: Normal appearance. He is well-developed and normal weight.   HENT:      Head: Normocephalic and atraumatic.      Right Ear: Tympanic membrane normal.      Left Ear: Tympanic membrane normal.      Nose: Nose normal.      Mouth/Throat:      Mouth: Mucous membranes are moist.      Pharynx: No oropharyngeal exudate.   Eyes:      Conjunctiva/sclera: Conjunctivae normal.      Pupils: Pupils are equal, round, and reactive to light.   Neck:      Vascular: No carotid bruit.   Cardiovascular:      Rate and Rhythm: Normal rate and regular rhythm.      Pulses: Normal pulses.      Heart sounds: Normal heart sounds. No murmur heard.     No gallop.   Pulmonary:      Effort: Pulmonary effort is normal.      Breath sounds: Normal breath sounds.   Abdominal:      General: Bowel sounds are normal.      Palpations:  Abdomen is soft. There is no mass.      Tenderness: There is no abdominal tenderness.   Musculoskeletal:         General: Normal range of motion.      Cervical back: Normal range of motion.   Skin:     General: Skin is warm and dry.   Neurological:      General: No focal deficit present.      Mental Status: He is alert and oriented to person, place, and time.   Psychiatric:         Mood and Affect: Mood normal.         Behavior: Behavior normal.         Assessment:       1. Class 3 obesity    2. Physical exam    3. Hypertension, essential    4. Benign prostatic hyperplasia, unspecified whether lower urinary tract symptoms present    5. Polyp of colon, unspecified part of colon, unspecified type    6. Former smoker    7. BMI 39.0-39.9,adult    8. History of DVT (deep vein thrombosis)    9. S/P tonsillectomy    10. Screening for AAA (abdominal aortic aneurysm)        Plan:       Class 3 obesity    Physical exam  -     US AAA Screening; Future; Expected date: 04/18/2024    Hypertension, essential  -     Basic Metabolic Panel; Future; Expected date: 04/18/2024    Benign prostatic hyperplasia, unspecified whether lower urinary tract symptoms present    Polyp of colon, unspecified part of colon, unspecified type    Former smoker    BMI 39.0-39.9,adult    History of DVT (deep vein thrombosis)    S/P tonsillectomy    Screening for AAA (abdominal aortic aneurysm)  -     US AAA Screening; Future; Expected date: 04/18/2024    Other orders  -     amLODIPine (NORVASC) 10 MG tablet; Take 1 tablet (10 mg total) by mouth once daily.  Dispense: 90 tablet; Refill: 1  -     hydroCHLOROthiazide (HYDRODIURIL) 25 MG tablet; Take 1 tablet (25 mg total) by mouth once daily.  Dispense: 90 tablet; Refill: 1    Abdominal aortic aneurysm screening.  Check BMP.  Refill all medications.  Monitor blood pressure closely.

## 2024-04-30 ENCOUNTER — HOSPITAL ENCOUNTER (OUTPATIENT)
Dept: RADIOLOGY | Facility: HOSPITAL | Age: 64
Discharge: HOME | End: 2024-04-30
Attending: FAMILY MEDICINE
Payer: MEDICARE

## 2024-04-30 DIAGNOSIS — Z00.00 PHYSICAL EXAM: ICD-10-CM

## 2024-04-30 DIAGNOSIS — Z13.6 SCREENING FOR AAA (ABDOMINAL AORTIC ANEURYSM): ICD-10-CM

## 2024-04-30 PROCEDURE — 76706 US ABDL AORTA SCREEN AAA: CPT | Mod: TC,PO

## 2024-04-30 PROCEDURE — 76706 US ABDL AORTA SCREEN AAA: CPT | Mod: 26,,, | Performed by: RADIOLOGY

## 2024-05-02 LAB
BUN SERPL-MCNC: 11 MG/DL (ref 8–27)
BUN/CREAT SERPL: 16 (ref 10–24)
CALCIUM SERPL-MCNC: 10.1 MG/DL (ref 8.6–10.2)
CHLORIDE SERPL-SCNC: 96 MMOL/L (ref 96–106)
CO2 SERPL-SCNC: 29 MMOL/L (ref 20–29)
CREAT SERPL-MCNC: 0.68 MG/DL (ref 0.76–1.27)
EST. GFR  (NO RACE VARIABLE): 103 ML/MIN/1.73
GLUCOSE SERPL-MCNC: 109 MG/DL (ref 70–99)
POTASSIUM SERPL-SCNC: 4.1 MMOL/L (ref 3.5–5.2)
SODIUM SERPL-SCNC: 137 MMOL/L (ref 134–144)

## 2024-09-12 RX ORDER — HYDROCHLOROTHIAZIDE 25 MG/1
25 TABLET ORAL
Qty: 90 TABLET | Refills: 2 | Status: SHIPPED | OUTPATIENT
Start: 2024-09-12

## 2024-09-12 RX ORDER — AMLODIPINE BESYLATE 10 MG/1
10 TABLET ORAL
Qty: 90 TABLET | Refills: 2 | Status: SHIPPED | OUTPATIENT
Start: 2024-09-12

## 2024-09-12 NOTE — TELEPHONE ENCOUNTER
Refill Decision Note   Grupo Chon  is requesting a refill authorization.  Brief Assessment and Rationale for Refill:  Approve     Medication Therapy Plan:         Comments:     Note composed:12:28 PM 09/12/2024

## 2024-09-12 NOTE — TELEPHONE ENCOUNTER
No care due was identified.  [facility] Embedded Care Due Messages. Reference number: [id number].   9/12/2024 2:07:29 AM CDT

## 2024-09-26 DIAGNOSIS — Z00.00 ENCOUNTER FOR MEDICARE ANNUAL WELLNESS EXAM: ICD-10-CM

## 2025-02-22 DIAGNOSIS — Z00.00 ENCOUNTER FOR MEDICARE ANNUAL WELLNESS EXAM: ICD-10-CM

## 2025-04-21 ENCOUNTER — OFFICE VISIT (OUTPATIENT)
Dept: FAMILY MEDICINE | Facility: CLINIC | Age: 65
End: 2025-04-21
Payer: MEDICARE

## 2025-04-21 VITALS
BODY MASS INDEX: 40.98 KG/M2 | TEMPERATURE: 99 F | WEIGHT: 292.69 LBS | DIASTOLIC BLOOD PRESSURE: 62 MMHG | OXYGEN SATURATION: 96 % | SYSTOLIC BLOOD PRESSURE: 110 MMHG | HEIGHT: 71 IN | HEART RATE: 72 BPM

## 2025-04-21 DIAGNOSIS — I10 HYPERTENSION, ESSENTIAL: Primary | ICD-10-CM

## 2025-04-21 DIAGNOSIS — R79.9 ABNORMAL FINDING OF BLOOD CHEMISTRY, UNSPECIFIED: ICD-10-CM

## 2025-04-21 DIAGNOSIS — Z86.718 HISTORY OF DVT (DEEP VEIN THROMBOSIS): ICD-10-CM

## 2025-04-21 DIAGNOSIS — Z12.5 PROSTATE CANCER SCREENING: ICD-10-CM

## 2025-04-21 DIAGNOSIS — R60.0 PEDAL EDEMA: ICD-10-CM

## 2025-04-21 DIAGNOSIS — R22.43 LOCALIZED SWELLING OF BOTH LOWER LEGS: ICD-10-CM

## 2025-04-21 DIAGNOSIS — J40 BRONCHITIS: ICD-10-CM

## 2025-04-21 DIAGNOSIS — I87.2 VENOUS INSUFFICIENCY: ICD-10-CM

## 2025-04-21 DIAGNOSIS — D69.2 SENILE PURPURA: ICD-10-CM

## 2025-04-21 PROCEDURE — 99999 PR PBB SHADOW E&M-EST. PATIENT-LVL III: CPT | Mod: PBBFAC,HCNC,, | Performed by: FAMILY MEDICINE

## 2025-04-21 RX ORDER — CLOTRIMAZOLE AND BETAMETHASONE DIPROPIONATE 10; .64 MG/G; MG/G
CREAM TOPICAL 2 TIMES DAILY
COMMUNITY
End: 2025-04-21 | Stop reason: SDUPTHER

## 2025-04-21 RX ORDER — ALBUTEROL SULFATE 90 UG/1
2 INHALANT RESPIRATORY (INHALATION) EVERY 4 HOURS PRN
COMMUNITY
End: 2025-04-21 | Stop reason: SDUPTHER

## 2025-04-21 RX ORDER — AMLODIPINE BESYLATE 5 MG/1
5 TABLET ORAL DAILY
Qty: 30 TABLET | Refills: 0 | Status: CANCELLED | OUTPATIENT
Start: 2025-04-21

## 2025-04-21 RX ORDER — DOXYCYCLINE 100 MG/1
100 CAPSULE ORAL 2 TIMES DAILY
Qty: 20 CAPSULE | Refills: 0 | Status: SHIPPED | OUTPATIENT
Start: 2025-04-21

## 2025-04-21 RX ORDER — CLOTRIMAZOLE AND BETAMETHASONE DIPROPIONATE 10; .64 MG/G; MG/G
CREAM TOPICAL 2 TIMES DAILY
Qty: 60 G | Refills: 0 | Status: SHIPPED | OUTPATIENT
Start: 2025-04-21

## 2025-04-21 RX ORDER — METOPROLOL SUCCINATE 25 MG/1
25 TABLET, EXTENDED RELEASE ORAL DAILY
COMMUNITY
End: 2025-04-21 | Stop reason: SDUPTHER

## 2025-04-21 RX ORDER — METOPROLOL SUCCINATE 25 MG/1
25 TABLET, EXTENDED RELEASE ORAL DAILY
Qty: 30 TABLET | Refills: 0 | Status: SHIPPED | OUTPATIENT
Start: 2025-04-21

## 2025-04-21 RX ORDER — ALBUTEROL SULFATE 90 UG/1
2 INHALANT RESPIRATORY (INHALATION) EVERY 6 HOURS PRN
Qty: 18 G | Refills: 0 | Status: SHIPPED | OUTPATIENT
Start: 2025-04-21

## 2025-04-21 NOTE — PROGRESS NOTES
SCRIBE #1 NOTE: I, Ramin Henriquez, am scribing for, and in the presence of,  Fady Melendez III, MD. I have scribed the entire note.     Subjective:       Patient ID: Grupo Givens is a 64 y.o. male.    Chief Complaint: Cough, Nausea, Chest Congestion, and Leg Swelling    Mr. Givens is here for a cough, nausea, chest congestion, and leg swelling with his last appointment being here on April 18, 2024. Pedal edema. States he has legs started swelling 6 weeks ago and skin is peeling and red. Reports they seemed to swell overnight. Bronchitis. States his respiratory infection started 4 days ago. He had some wheezing. He had diarrhea and nausea along with other sinus issues. He had a venous insuffiencey procedure in [id number]. Saw Dr. Flor for this. History of DVT in his right leg about 16 years ago. Former smoker, quit 27 years ago. BMI of 40.82. Cardiovascular good. No chest pain. No palpitations. Blood pressure is 110/62. Hypertension controlled. On Amlodipine 10mg for years. Reports Lisinopril made him feel like he was having a heart attack. Senile purpura. This is on his forearms. He is taking aspirin every other day due to this.     Review of Systems   Constitutional:  Negative for chills and fever.   HENT:  Positive for congestion. Negative for sore throat.    Eyes:  Negative for pain and visual disturbance.   Respiratory:  Positive for cough and wheezing. Negative for chest tightness and shortness of breath.    Cardiovascular:  Positive for leg swelling. Negative for chest pain.   Gastrointestinal:  Positive for diarrhea and nausea.   Endocrine: Negative for polydipsia and polyuria.   Genitourinary:  Negative for dysuria and flank pain.   Musculoskeletal:  Negative for back pain, neck pain and neck stiffness.   Skin:  Negative for rash.   Allergic/Immunologic: Negative for immunocompromised state.   Neurological:  Negative for dizziness and weakness.   Hematological:  Does not bruise/bleed easily.    Psychiatric/Behavioral:  Negative for agitation and behavioral problems.    All other systems reviewed and are negative.      Objective:      Physical examination: Vital signs noted. No acute distress. No carotid bruit. Regular heart rate and rhythm. Lungs clear to auscultation bilaterally. Abdomen bowel sounds positive soft and nontender. 2+ pedal edema on left and 1+ pedal edema on the right. 2+ pedal pulses. Bilateral shins are reddened with left greater than right. Both shins have dry, scaly skin.       Assessment:       1. Hypertension, essential    2. History of DVT (deep vein thrombosis)    3. Venous insufficiency    4. Senile purpura    5. BMI 40.0-44.9, adult    6. Bronchitis    7. Pedal edema    8. Localized swelling of both lower legs    9. Prostate cancer screening    10. Abnormal finding of blood chemistry, unspecified        Plan:       Hypertension, essential  -     Comprehensive Metabolic Panel; Future; Expected date: 04/21/2025  -     Lipid Panel; Future; Expected date: 04/21/2025  -     Hemoglobin A1C; Future; Expected date: 04/21/2025    History of DVT (deep vein thrombosis)    Venous insufficiency    Senile purpura    BMI 40.0-44.9, adult    Bronchitis  -     CBC Auto Differential; Future; Expected date: 04/21/2025    Pedal edema  -     D-Dimer, Quantitative; Future; Expected date: 04/21/2025  -     BNP; Future; Expected date: 04/21/2025    Localized swelling of both lower legs    Prostate cancer screening  -     PSA, Screening; Future; Expected date: 04/21/2025    Abnormal finding of blood chemistry, unspecified  -     Hemoglobin A1C; Future; Expected date: 04/21/2025    Other orders  -     albuterol (PROVENTIL/VENTOLIN HFA) 90 mcg/actuation inhaler; Inhale 2 puffs into the lungs every 6 (six) hours as needed for Wheezing. Rescue  Dispense: 18 g; Refill: 0  -     doxycycline (VIBRAMYCIN) 100 MG Cap; Take 1 capsule (100 mg total) by mouth 2 (two) times a day.  Dispense: 20 capsule; Refill: 0  -      clotrimazole-betamethasone 1-0.05% (LOTRISONE) cream; Apply topically 2 (two) times daily.  Dispense: 60 g; Refill: 0  -     metoprolol succinate (TOPROL-XL) 25 MG 24 hr tablet; Take 1 tablet (25 mg total) by mouth once daily.  Dispense: 30 tablet; Refill: 0    CBC CMP lipids A1c PSA D-dimer and BNP ordered.  Albuterol HFA 2 puffs q.6 hours PRN.  Vibramycin 100 mg b.i.d. for 10 days.  Decrease Norvasc to 5 mg daily 30 pills.  See if this helps the edema.  Lotrisone cream 60 g apply b.i.d. to the shins.  Follow-up in 3 weeks.  Add Toprol XL 25 daily for further control of his hypertension.  Thirty pills.  All care gaps addressed or discussed.     I, Fady Melendez III, MD, personally performed the services described in this documentation. All medical record entries made by the scribe were at my direction and in my presence. I have reviewed the chart and agree that the record reflects my personal performance and is accurate and complete.

## 2025-04-22 PROBLEM — D69.2 SENILE PURPURA: Status: ACTIVE | Noted: 2025-04-22

## 2025-04-22 PROBLEM — I87.2 VENOUS INSUFFICIENCY: Status: ACTIVE | Noted: 2025-04-22

## 2025-05-15 RX ORDER — METOPROLOL SUCCINATE 25 MG/1
25 TABLET, EXTENDED RELEASE ORAL
Qty: 90 TABLET | Refills: 3 | Status: SHIPPED | OUTPATIENT
Start: 2025-05-15

## 2025-05-15 NOTE — TELEPHONE ENCOUNTER
Refill Routing Note   Medication(s) are not appropriate for processing by [facility] for the following reason(s):        New or recently adjusted medication    [facility] action(s):  Defer        Medication Therapy Plan: New start (4/21/25)      Appointments  past 12m or future 3m with PCP    Date Provider   Last Visit   4/21/2025 Fady Melendez III, MD   Next Visit   5/28/2025 Fady Melendez III, MD   ED visits in past 90 days: 0        Note composed:3:13 PM 05/15/2025

## 2025-05-15 NOTE — TELEPHONE ENCOUNTER
No care due was identified.  [facility] Embedded Care Due Messages. Reference number: [id number].   5/15/2025 12:30:40 PM CDT

## 2025-06-15 ENCOUNTER — PATIENT MESSAGE (OUTPATIENT)
Dept: FAMILY MEDICINE | Facility: CLINIC | Age: 65
End: 2025-06-15
Payer: MEDICARE

## 2025-06-17 ENCOUNTER — HOSPITAL ENCOUNTER (EMERGENCY)
Facility: HOSPITAL | Age: 65
Discharge: ANOTHER HOSPITAL | End: 2025-06-18
Attending: EMERGENCY MEDICINE
Payer: MEDICARE

## 2025-06-17 ENCOUNTER — RESULTS FOLLOW-UP (OUTPATIENT)
Dept: FAMILY MEDICINE | Facility: CLINIC | Age: 65
End: 2025-06-17

## 2025-06-17 ENCOUNTER — LAB VISIT (OUTPATIENT)
Dept: LAB | Facility: HOSPITAL | Age: 65
End: 2025-06-17
Attending: FAMILY MEDICINE
Payer: MEDICARE

## 2025-06-17 DIAGNOSIS — R79.9 ABNORMAL FINDING OF BLOOD CHEMISTRY, UNSPECIFIED: ICD-10-CM

## 2025-06-17 DIAGNOSIS — R10.13 EPIGASTRIC ABDOMINAL PAIN: ICD-10-CM

## 2025-06-17 DIAGNOSIS — K80.01 CALCULUS OF GALLBLADDER WITH ACUTE CHOLECYSTITIS AND OBSTRUCTION: ICD-10-CM

## 2025-06-17 DIAGNOSIS — Z12.5 PROSTATE CANCER SCREENING: ICD-10-CM

## 2025-06-17 DIAGNOSIS — R97.20 ELEVATED PROSTATE SPECIFIC ANTIGEN (PSA): Primary | ICD-10-CM

## 2025-06-17 DIAGNOSIS — E87.6 HYPOKALEMIA: ICD-10-CM

## 2025-06-17 DIAGNOSIS — R60.0 PEDAL EDEMA: ICD-10-CM

## 2025-06-17 DIAGNOSIS — J40 BRONCHITIS: ICD-10-CM

## 2025-06-17 DIAGNOSIS — K85.10 GALLSTONE PANCREATITIS: Primary | ICD-10-CM

## 2025-06-17 DIAGNOSIS — I10 HYPERTENSION, ESSENTIAL: ICD-10-CM

## 2025-06-17 LAB
ABSOLUTE EOSINOPHIL (SMH): 0.07 K/UL
ABSOLUTE MONOCYTE (SMH): 0.66 K/UL (ref 0.3–1)
ABSOLUTE NEUTROPHIL COUNT (SMH): 4 K/UL (ref 1.8–7.7)
ALBUMIN SERPL-MCNC: 3.9 G/DL (ref 3.4–4.8)
ALBUMIN SERPL-MCNC: 4.5 G/DL (ref 3.5–5.2)
ALBUMIN/GLOB SERPL: 1.3 RATIO (ref 1.1–2)
ALP SERPL-CCNC: 43 UNIT/L (ref 55–135)
ALP SERPL-CCNC: 61 UNIT/L (ref 40–150)
ALT SERPL-CCNC: 14 UNIT/L (ref 10–44)
ALT SERPL-CCNC: 38 UNIT/L (ref 0–55)
ANION GAP (SMH): 7 MMOL/L (ref 8–16)
ANION GAP SERPL CALC-SCNC: 8 MEQ/L
AST SERPL-CCNC: 18 UNIT/L (ref 10–40)
AST SERPL-CCNC: 86 UNIT/L (ref 11–45)
BACTERIA #/AREA URNS AUTO: ABNORMAL /HPF
BASOPHILS # BLD AUTO: 0.03 K/UL
BASOPHILS # BLD AUTO: 0.03 X10(3)/MCL
BASOPHILS NFR BLD AUTO: 0.4 %
BASOPHILS NFR BLD AUTO: 0.5 %
BILIRUB SERPL-MCNC: 1 MG/DL (ref 0.1–1)
BILIRUB SERPL-MCNC: 2.3 MG/DL
BILIRUB UR QL STRIP.AUTO: NEGATIVE
BNP SERPL-MCNC: 403 PG/ML
BUN SERPL-MCNC: 13 MG/DL (ref 8–23)
BUN SERPL-MCNC: 13.3 MG/DL (ref 8.4–25.7)
CALCIUM SERPL-MCNC: 10 MG/DL (ref 8.7–10.5)
CALCIUM SERPL-MCNC: 9.5 MG/DL (ref 8.8–10)
CHLORIDE SERPL-SCNC: 98 MMOL/L (ref 95–110)
CHLORIDE SERPL-SCNC: 98 MMOL/L (ref 98–107)
CHOLEST SERPL-MCNC: 176 MG/DL (ref 120–199)
CHOLEST/HDLC SERPL: 2.3 {RATIO} (ref 2–5)
CLARITY UR: CLEAR
CO2 SERPL-SCNC: 27 MMOL/L (ref 23–31)
CO2 SERPL-SCNC: 32 MMOL/L (ref 23–29)
COLOR UR AUTO: YELLOW
CREAT SERPL-MCNC: 0.7 MG/DL (ref 0.5–1.4)
CREAT SERPL-MCNC: 0.72 MG/DL (ref 0.72–1.25)
CREAT/UREA NIT SERPL: 18
D DIMER PPP IA.FEU-MCNC: <0.19 MG/L FEU
EOSINOPHIL # BLD AUTO: 0.07 X10(3)/MCL (ref 0–0.9)
EOSINOPHIL NFR BLD AUTO: 0.8 %
ERYTHROCYTE [DISTWIDTH] IN BLOOD BY AUTOMATED COUNT: 14 % (ref 11.5–17)
ERYTHROCYTE [DISTWIDTH] IN BLOOD BY AUTOMATED COUNT: 14.5 % (ref 11.5–14.5)
ETHANOL SERPL-MCNC: <10 MG/DL
GFR SERPLBLD CREATININE-BSD FMLA CKD-EPI: >60 ML/MIN/1.73/M2
GFR SERPLBLD CREATININE-BSD FMLA CKD-EPI: >60 ML/MIN/1.73/M2
GLOBULIN SER-MCNC: 3 GM/DL (ref 2.4–3.5)
GLUCOSE SERPL-MCNC: 113 MG/DL (ref 70–110)
GLUCOSE SERPL-MCNC: 122 MG/DL (ref 82–115)
GLUCOSE UR QL STRIP: NORMAL
HCT VFR BLD AUTO: 44 % (ref 40–54)
HCT VFR BLD AUTO: 44.8 % (ref 42–52)
HDLC SERPL-MCNC: 75 MG/DL (ref 40–75)
HDLC SERPL: 42.6 % (ref 20–50)
HGB BLD-MCNC: 15 GM/DL (ref 14–18)
HGB BLD-MCNC: 15.6 G/DL (ref 14–18)
HGB UR QL STRIP: NEGATIVE
HYALINE CASTS #/AREA URNS LPF: ABNORMAL /LPF
IMM GRANULOCYTES # BLD AUTO: 0.03 K/UL (ref 0–0.04)
IMM GRANULOCYTES # BLD AUTO: 0.03 X10(3)/MCL (ref 0–0.04)
IMM GRANULOCYTES NFR BLD AUTO: 0.4 %
IMM GRANULOCYTES NFR BLD AUTO: 0.5 % (ref 0–0.5)
KETONES UR QL STRIP: NEGATIVE
LDLC SERPL CALC-MCNC: 89.2 MG/DL (ref 63–159)
LEUKOCYTE ESTERASE UR QL STRIP: NEGATIVE
LIPASE SERPL-CCNC: 2022 U/L
LYMPHOCYTES # BLD AUTO: 0.91 X10(3)/MCL (ref 0.6–4.6)
LYMPHOCYTES # BLD AUTO: 0.95 K/UL (ref 1–4.8)
LYMPHOCYTES NFR BLD AUTO: 10.7 %
MAGNESIUM SERPL-MCNC: 1.7 MG/DL (ref 1.6–2.6)
MCH RBC QN AUTO: 33.7 PG (ref 27–31)
MCH RBC QN AUTO: 34.2 PG (ref 27–31)
MCHC RBC AUTO-ENTMCNC: 34.1 G/DL (ref 32–36)
MCHC RBC AUTO-ENTMCNC: 34.8 G/DL (ref 33–36)
MCV RBC AUTO: 98.2 FL (ref 80–94)
MCV RBC AUTO: 99 FL (ref 82–98)
MONOCYTES # BLD AUTO: 0.27 X10(3)/MCL (ref 0.1–1.3)
MONOCYTES NFR BLD AUTO: 3.2 %
MUCOUS THREADS URNS QL MICRO: ABNORMAL /LPF
NEUTROPHILS # BLD AUTO: 7.19 X10(3)/MCL (ref 2.1–9.2)
NEUTROPHILS NFR BLD AUTO: 84.5 %
NITRITE UR QL STRIP: NEGATIVE
NONHDLC SERPL-MCNC: 101 MG/DL
NRBC BLD AUTO-RTO: 0 %
NUCLEATED RBC (/100WBC) (SMH): 0 /100 WBC
PH UR STRIP: 6.5 [PH]
PLATELET # BLD AUTO: 177 X10(3)/MCL (ref 130–400)
PLATELET # BLD AUTO: 200 K/UL (ref 150–450)
PMV BLD AUTO: 10 FL (ref 9.2–12.9)
PMV BLD AUTO: 10.1 FL (ref 7.4–10.4)
POTASSIUM SERPL-SCNC: 2.9 MMOL/L (ref 3.5–5.1)
POTASSIUM SERPL-SCNC: 3.8 MMOL/L (ref 3.5–5.1)
PROT SERPL-MCNC: 6.9 GM/DL (ref 5.8–7.6)
PROT SERPL-MCNC: 7.3 GM/DL (ref 6–8.4)
PROT UR QL STRIP: NEGATIVE
PSA SERPL-MCNC: 4.91 NG/ML (ref ?–4)
RBC # BLD AUTO: 4.45 M/UL (ref 4.6–6.2)
RBC # BLD AUTO: 4.56 X10(6)/MCL (ref 4.7–6.1)
RBC #/AREA URNS AUTO: ABNORMAL /HPF
RELATIVE EOSINOPHIL (SMH): 1.2 % (ref 0–8)
RELATIVE LYMPHOCYTE (SMH): 16.5 % (ref 18–48)
RELATIVE MONOCYTE (SMH): 11.4 % (ref 4–15)
RELATIVE NEUTROPHIL (SMH): 69.9 % (ref 38–73)
SODIUM SERPL-SCNC: 133 MMOL/L (ref 136–145)
SODIUM SERPL-SCNC: 137 MMOL/L (ref 136–145)
SP GR UR STRIP.AUTO: 1.02 (ref 1–1.03)
SPERM URNS QL MICRO: ABNORMAL /HPF
SQUAMOUS #/AREA URNS LPF: ABNORMAL /HPF
TRIGL SERPL-MCNC: 59 MG/DL (ref 30–150)
TROPONIN I SERPL-MCNC: <0.01 NG/ML (ref 0–0.04)
UROBILINOGEN UR STRIP-ACNC: NORMAL
WBC # BLD AUTO: 5.77 K/UL (ref 3.9–12.7)
WBC # BLD AUTO: 8.5 X10(3)/MCL (ref 4.5–11.5)
WBC #/AREA URNS AUTO: ABNORMAL /HPF

## 2025-06-17 PROCEDURE — 84484 ASSAY OF TROPONIN QUANT: CPT | Performed by: EMERGENCY MEDICINE

## 2025-06-17 PROCEDURE — 82077 ASSAY SPEC XCP UR&BREATH IA: CPT | Performed by: EMERGENCY MEDICINE

## 2025-06-17 PROCEDURE — 63600175 PHARM REV CODE 636 W HCPCS: Performed by: EMERGENCY MEDICINE

## 2025-06-17 PROCEDURE — 96375 TX/PRO/DX INJ NEW DRUG ADDON: CPT

## 2025-06-17 PROCEDURE — 84153 ASSAY OF PSA TOTAL: CPT

## 2025-06-17 PROCEDURE — 83880 ASSAY OF NATRIURETIC PEPTIDE: CPT

## 2025-06-17 PROCEDURE — 81001 URINALYSIS AUTO W/SCOPE: CPT | Performed by: EMERGENCY MEDICINE

## 2025-06-17 PROCEDURE — 25000003 PHARM REV CODE 250: Performed by: EMERGENCY MEDICINE

## 2025-06-17 PROCEDURE — 25500020 PHARM REV CODE 255: Performed by: EMERGENCY MEDICINE

## 2025-06-17 PROCEDURE — 85025 COMPLETE CBC W/AUTO DIFF WBC: CPT

## 2025-06-17 PROCEDURE — 85379 FIBRIN DEGRADATION QUANT: CPT

## 2025-06-17 PROCEDURE — 96376 TX/PRO/DX INJ SAME DRUG ADON: CPT

## 2025-06-17 PROCEDURE — 83735 ASSAY OF MAGNESIUM: CPT | Performed by: EMERGENCY MEDICINE

## 2025-06-17 PROCEDURE — 83690 ASSAY OF LIPASE: CPT | Performed by: EMERGENCY MEDICINE

## 2025-06-17 PROCEDURE — 80061 LIPID PANEL: CPT

## 2025-06-17 PROCEDURE — 93005 ELECTROCARDIOGRAM TRACING: CPT

## 2025-06-17 PROCEDURE — 36415 COLL VENOUS BLD VENIPUNCTURE: CPT | Mod: PO

## 2025-06-17 PROCEDURE — 83036 HEMOGLOBIN GLYCOSYLATED A1C: CPT

## 2025-06-17 PROCEDURE — 96365 THER/PROPH/DIAG IV INF INIT: CPT

## 2025-06-17 PROCEDURE — 80307 DRUG TEST PRSMV CHEM ANLYZR: CPT | Performed by: EMERGENCY MEDICINE

## 2025-06-17 PROCEDURE — 80053 COMPREHEN METABOLIC PANEL: CPT

## 2025-06-17 PROCEDURE — 85025 COMPLETE CBC W/AUTO DIFF WBC: CPT | Performed by: EMERGENCY MEDICINE

## 2025-06-17 PROCEDURE — 80053 COMPREHEN METABOLIC PANEL: CPT | Performed by: EMERGENCY MEDICINE

## 2025-06-17 RX ORDER — POTASSIUM CHLORIDE 7.45 MG/ML
10 INJECTION INTRAVENOUS
Status: COMPLETED | OUTPATIENT
Start: 2025-06-18 | End: 2025-06-18

## 2025-06-17 RX ORDER — MORPHINE SULFATE 2 MG/ML
8 INJECTION, SOLUTION INTRAMUSCULAR; INTRAVENOUS
Refills: 0 | Status: COMPLETED | OUTPATIENT
Start: 2025-06-17 | End: 2025-06-17

## 2025-06-17 RX ORDER — SODIUM CHLORIDE, SODIUM LACTATE, POTASSIUM CHLORIDE, CALCIUM CHLORIDE 600; 310; 30; 20 MG/100ML; MG/100ML; MG/100ML; MG/100ML
1000 INJECTION, SOLUTION INTRAVENOUS CONTINUOUS
Status: DISCONTINUED | OUTPATIENT
Start: 2025-06-17 | End: 2025-06-18 | Stop reason: HOSPADM

## 2025-06-17 RX ORDER — POTASSIUM CHLORIDE 20 MEQ/1
40 TABLET, EXTENDED RELEASE ORAL
Status: COMPLETED | OUTPATIENT
Start: 2025-06-17 | End: 2025-06-17

## 2025-06-17 RX ORDER — MORPHINE SULFATE 2 MG/ML
8 INJECTION, SOLUTION INTRAMUSCULAR; INTRAVENOUS
Status: COMPLETED | OUTPATIENT
Start: 2025-06-18 | End: 2025-06-17

## 2025-06-17 RX ORDER — ONDANSETRON HYDROCHLORIDE 2 MG/ML
4 INJECTION, SOLUTION INTRAVENOUS
Status: COMPLETED | OUTPATIENT
Start: 2025-06-18 | End: 2025-06-17

## 2025-06-17 RX ORDER — ONDANSETRON HYDROCHLORIDE 2 MG/ML
4 INJECTION, SOLUTION INTRAVENOUS
Status: COMPLETED | OUTPATIENT
Start: 2025-06-17 | End: 2025-06-17

## 2025-06-17 RX ADMIN — MORPHINE SULFATE 8 MG: 2 INJECTION, SOLUTION INTRAMUSCULAR; INTRAVENOUS at 11:06

## 2025-06-17 RX ADMIN — SODIUM CHLORIDE, POTASSIUM CHLORIDE, SODIUM LACTATE AND CALCIUM CHLORIDE 1000 ML: 600; 310; 30; 20 INJECTION, SOLUTION INTRAVENOUS at 10:06

## 2025-06-17 RX ADMIN — ONDANSETRON 4 MG: 2 INJECTION INTRAMUSCULAR; INTRAVENOUS at 10:06

## 2025-06-17 RX ADMIN — MORPHINE SULFATE 8 MG: 2 INJECTION, SOLUTION INTRAMUSCULAR; INTRAVENOUS at 10:06

## 2025-06-17 RX ADMIN — ONDANSETRON 4 MG: 2 INJECTION INTRAMUSCULAR; INTRAVENOUS at 11:06

## 2025-06-17 RX ADMIN — POTASSIUM CHLORIDE 40 MEQ: 1500 TABLET, EXTENDED RELEASE ORAL at 10:06

## 2025-06-17 RX ADMIN — POTASSIUM CHLORIDE 10 MEQ: 7.46 INJECTION, SOLUTION INTRAVENOUS at 11:06

## 2025-06-17 RX ADMIN — IOHEXOL 93 ML: 350 INJECTION, SOLUTION INTRAVENOUS at 11:06

## 2025-06-18 ENCOUNTER — RESULTS FOLLOW-UP (OUTPATIENT)
Dept: EMERGENCY MEDICINE | Facility: HOSPITAL | Age: 65
End: 2025-06-18

## 2025-06-18 ENCOUNTER — TELEPHONE (OUTPATIENT)
Dept: FAMILY MEDICINE | Facility: CLINIC | Age: 65
End: 2025-06-18
Payer: MEDICARE

## 2025-06-18 VITALS
RESPIRATION RATE: 17 BRPM | BODY MASS INDEX: 41.88 KG/M2 | OXYGEN SATURATION: 96 % | HEART RATE: 84 BPM | DIASTOLIC BLOOD PRESSURE: 65 MMHG | SYSTOLIC BLOOD PRESSURE: 122 MMHG | HEIGHT: 71 IN | WEIGHT: 299.13 LBS | TEMPERATURE: 98 F

## 2025-06-18 LAB
ACCEPTIBLE SP GR UR QL: 1.02 (ref 1–1.03)
AMPHET UR QL SCN: NEGATIVE
BARBITURATE SCN PRESENT UR: NEGATIVE
BENZODIAZ UR QL SCN: NEGATIVE
CANNABINOIDS UR QL SCN: POSITIVE
COCAINE UR QL SCN: NEGATIVE
EAG (SMH): 100 MG/DL (ref 68–131)
FENTANYL UR QL SCN: NEGATIVE
HBA1C MFR BLD: 5.1 % (ref 4.5–6.2)
MDMA UR QL SCN: NEGATIVE
OHS QRS DURATION: 140 MS
OHS QTC CALCULATION: 435 MS
OPIATES UR QL SCN: POSITIVE
PCP UR QL: NEGATIVE
PH UR: 6.5 [PH] (ref 3–11)

## 2025-06-18 PROCEDURE — 96366 THER/PROPH/DIAG IV INF ADDON: CPT

## 2025-06-18 PROCEDURE — 63600175 PHARM REV CODE 636 W HCPCS: Performed by: EMERGENCY MEDICINE

## 2025-06-18 PROCEDURE — 96365 THER/PROPH/DIAG IV INF INIT: CPT | Mod: 59

## 2025-06-18 PROCEDURE — 25000003 PHARM REV CODE 250: Performed by: EMERGENCY MEDICINE

## 2025-06-18 PROCEDURE — 99285 EMERGENCY DEPT VISIT HI MDM: CPT

## 2025-06-18 RX ADMIN — PIPERACILLIN AND TAZOBACTAM 4.5 G: 4; .5 INJECTION, POWDER, LYOPHILIZED, FOR SOLUTION INTRAVENOUS; PARENTERAL at 12:06

## 2025-06-18 RX ADMIN — POTASSIUM CHLORIDE 10 MEQ: 7.46 INJECTION, SOLUTION INTRAVENOUS at 12:06

## 2025-06-18 NOTE — TELEPHONE ENCOUNTER
----- Message from Fady Melendez MD sent at 6/17/2025 10:14 PM CDT -----  PSA is elevated.  FOLLOW-UP AS RECOMMENDED  ----- Message -----  From: Lab, Background User  Sent: 6/17/2025  12:55 PM CDT  To: Fady Melendez III, MD

## 2025-06-18 NOTE — ED PROVIDER NOTES
"Encounter Date: 6/17/2025       History     Chief Complaint   Patient presents with    Abdominal Pain     Pt c/o Abd/flank pain with N/V starting at 1800 this evening. Pt states, "it feels like a band going around my back to my stomach."     Nausea    Vomiting    Flank Pain     Sudden onset a short time ago of epigastric and bilateral upper abdominal, flank, and back pain with persistent nausea and vomiting, similar episodes in the past apparently not investigated.  Active vomiting on arrival.  Underlying history of obesity, reactive airways, prostatic hypertrophy, hypertension.  Coincidentally had labs this morning for routine annual exam scheduled next week.  Drinks on a regular basis, unclear quantity.  Denies any history of kidney stones, pancreatitis, hepatitis, gallstones, other gallbladder disease, peptic ulcer disease, gastritis, or other GI problems.  No identified triggers, no known ill contacts.    The history is provided by the patient. No  was used.     Review of patient's allergies indicates:   Allergen Reactions    Lisinopril Other (See Comments)     Past Medical History:   Diagnosis Date    BPH (benign prostatic hyperplasia)     Essential (primary) hypertension      Past Surgical History:   Procedure Laterality Date    ADENOIDECTOMY      TONSILLECTOMY       Family History   Problem Relation Name Age of Onset    Heart disease Mother  73        MI    Depression Mother      Heart disease Father          valve disease    Alzheimer's disease Father      Heart disease Maternal Grandmother          enlarged heart    Cancer Maternal Grandfather      Alcohol abuse Maternal Grandfather      Heart disease Maternal Grandfather      Alzheimer's disease Paternal Grandmother       Social History[1]  Review of Systems   Constitutional:  Negative for fever.   Gastrointestinal:  Positive for abdominal pain, nausea and vomiting.   Genitourinary:  Positive for flank pain. Negative for decreased urine " volume, difficulty urinating, dysuria, hematuria and urgency.   Musculoskeletal:  Positive for back pain.       Physical Exam     Initial Vitals [06/17/25 2145]   BP Pulse Resp Temp SpO2   (!) 159/76 71 20 97.6 °F (36.4 °C) 98 %      MAP       --         Physical Exam    Nursing note and vitals reviewed.  Constitutional: He appears well-developed and well-nourished. He is not diaphoretic. He appears distressed.   HENT:   Head: Normocephalic and atraumatic.   Eyes: EOM are normal. Pupils are equal, round, and reactive to light.   Neck: Neck supple.   Normal range of motion.  Cardiovascular:  Normal rate and regular rhythm.           Pulmonary/Chest: Breath sounds normal. No respiratory distress.   Abdominal: Abdomen is soft. Bowel sounds are normal. He exhibits no distension. There is abdominal tenderness.   Mild diffuse bilateral upper abdominal tenderness to palpation without rebound or guarding, seems most pronounced in the epigastrium. There is no rebound and no guarding.   Musculoskeletal:      Cervical back: Normal range of motion and neck supple.     Neurological: He is alert and oriented to person, place, and time. He has normal strength.         ED Course   Procedures  Labs Reviewed   COMPREHENSIVE METABOLIC PANEL - Abnormal       Result Value    Sodium 133 (*)     Potassium 2.9 (*)     Chloride 98      CO2 27      Glucose 122 (*)     Blood Urea Nitrogen 13.3      Creatinine 0.72      Calcium 9.5      Protein Total 6.9      Albumin 3.9      Globulin 3.0      Albumin/Globulin Ratio 1.3      Bilirubin Total 2.3 (*)     ALP 61      ALT 38      AST 86 (*)     eGFR >60      Anion Gap 8.0      BUN/Creatinine Ratio 18     LIPASE - Abnormal    Lipase Level 2,022 (*)    URINALYSIS, REFLEX TO URINE CULTURE - Abnormal    Color, UA Yellow      Appearance, UA Clear      Specific Gravity, UA 1.020      pH, UA 6.5      Protein, UA Negative      Glucose, UA Normal      Ketones, UA Negative      Blood, UA Negative       Bilirubin, UA Negative      Urobilinogen, UA Normal      Nitrites, UA Negative      Leukocyte Esterase, UA Negative      RBC, UA 0-5      WBC, UA 0-5      Bacteria, UA Occasional (*)     Squamous Epithelial Cells, UA Trace (*)     Mucous, UA Trace (*)     Sperm, UA Trace (*)     Hyaline Casts, UA 6-10 (*)    DRUG SCREEN, URINE (BEAKER) - Abnormal    Amphetamines, Urine Negative      Barbiturates, Urine Negative      Benzodiazepine, Urine Negative      Cannabinoids, Urine Positive (*)     Cocaine, Urine Negative      Fentanyl, Urine Negative      MDMA, Urine Negative      Opiates, Urine Positive (*)     Phencyclidine, Urine Negative      pH, Urine 6.5      Specific Gravity, Urine Auto 1.020      Narrative:     Cut off concentrations:    Amphetamines - 1000 ng/ml  Barbiturates - 200 ng/ml  Benzodiazepine - 200 ng/ml  Cannabinoids (THC) - 50 ng/ml  Cocaine - 300 ng/ml  Fentanyl - 1.0 ng/ml  MDMA - 500 ng/ml  Opiates - 300 ng/ml   Phencyclidine (PCP) - 25 ng/ml    Specimen submitted for drug analysis and tested for pH and specific gravity in order to evaluate sample integrity. Suspect tampering if specific gravity is <1.003 and/or pH is not within the range of 4.5 - 8.0  False negatives may result form substances such as bleach added to urine.  False positives may result for the presence of a substance with similar chemical structure to the drug or its metabolite.    This test provides only a PRELIMINARY analytical test result. A more specific alternate chemical method must be used in order to obtain a confirmed analytical result. Gas chromatography/mass spectrometry (GC/MS) is the preferred confirmatory method. Other chemical confirmation methods are available. Clinical consideration and professional judgement should be applied to any drug of abuse test result, particularly when preliminary positive results are used.    Positive results will be confirmed only at the physicians request. Unconfirmed screening results are  to be used only for medical purposes (treatment).        CBC WITH DIFFERENTIAL - Abnormal    WBC 8.50      RBC 4.56 (*)     Hgb 15.6      Hct 44.8      MCV 98.2 (*)     MCH 34.2 (*)     MCHC 34.8      RDW 14.0      Platelet 177      MPV 10.1      Neut % 84.5      Lymph % 10.7      Mono % 3.2      Eos % 0.8      Basophil % 0.4      Imm Grans % 0.4      Neut # 7.19      Lymph # 0.91      Mono # 0.27      Eos # 0.07      Baso # 0.03      Imm Gran # 0.03      NRBC% 0.0     ALCOHOL,MEDICAL (ETHANOL) - Normal    Ethanol Level <10.0     TROPONIN I - Normal    Troponin-I <0.010     MAGNESIUM - Normal    Magnesium Level 1.70     CBC W/ AUTO DIFFERENTIAL    Narrative:     The following orders were created for panel order CBC W/ AUTO DIFFERENTIAL.  Procedure                               Abnormality         Status                     ---------                               -----------         ------                     CBC with Differential[[id number]]       Abnormal            Final result                 Please view results for these tests on the individual orders.     EKG Readings: (Independently Interpreted)   Initial Reading: No STEMI. Rhythm: Normal Sinus Rhythm. Ectopy: No Ectopy.   Normal sinus rhythm at 75 beats per minute, first-degree AV block with W[place] interval 206 milliseconds, left axis deviation, left bundle-branch block.  Slightly noisy baseline.  No acute ischemic change and no interval change from 4 years ago.       Imaging Results              CT Abdomen Pelvis With IV Contrast NO Oral Contrast (Preliminary result)  Result time 06/18/25 00:11:37      Preliminary result by Esteban Pro Jr., MD (06/18/25 00:11:37)                   Narrative:    START OF REPORT:  Technique: CT of the abdomen and pelvis was performed with axial images as well as sagittal and coronal reconstruction images with intravenous contrast.    Comparison: Comparison is with study dated 2025-06-17 22:37:09.    Clinical History:  Abdominal pain, acute, nonlocalized.    Dosage Information: Automated Exposure Control was utilized 889.35 mGy.cm.    Findings:  Lines and Tubes: None.  Thorax:  Lungs: There is mild nonspecific dependent change at the lung bases. No focal infiltrate or consolidation is seen.  Pleura: No effusions or thickening.  Heart: The heart size is within normal limits. Mild coronary artery calcification is seen.  Abdomen:  Abdominal Wall: No abdominal wall pathology is seen.  Liver: The liver appears unremarkable.  Gallbladder: Multiple gallstones are seen in the gallbladder which otherwise appears unremarkable.  Pancreas: A 4 mm punctate calcification is seen at the pancreatic head on image 65 series 2. There is mild dilatation of the proximal pancreatic duct for instance on image 48 series 2 measuring 5 mm wide. There is diffuse pancreatic edema with moderate surrounding stranding extending to the right anterior pararenal space and right paracolic gutter. No organize fluid collection identified. This is consistent with acute pancreatitis with possible obstructive duct stone.  Spleen: The spleen appears unremarkable. Splenule measuring 1.9 cm in diameter is seen on Image 58, Series 2 inferior to the spleen.  Adrenals: The adrenal glands appear unremarkable.  Kidneys: The kidneys appear unremarkable with no stones cysts masses or hydronephrosis.  Aorta: There is moderate calcification of the abdominal aorta and its branches.  IVC: Unremarkable.  Bowel:  Esophagus: The visualized esophagus appears unremarkable.  Stomach: The stomach appears unremarkable.  Duodenum: Air fluid level alongside the 2nd duodenum likely a duodenal diverticulum.  Small Bowel: The small bowel appears unremarkable.  Colon: Multiple diverticula are seen in the hepatic flexure and splenic flexure through to the sigmoid colon. No associated inflammatory stranding is seen to suggest diverticulitis.  Appendix: The appendix appears unremarkable seen on Image  99, Series 2 through Image 103, Series 2.  Peritoneum: No intraperitoneal free air or ascites is seen.    Pelvis:  Bladder: A 4 cm diverticulum is seen in the left posterolateral urinary bladder wall.  Male:  Prostate gland: The prostate gland is moderately enlarged with its median lobe projecting into the bladder base.  Inguinal Findings:  Inguinal Hernia: Incidental note is made of small uncomplicated mesenteric fat containing bilateral inguinal hernias.    Bony structures:  Dorsal Spine: There is mild spondylosis of the visualized dorsal spine.  Bony Pelvis: There is mild degenerative change of the bilateral hips.      Impression:  1. A 4 mm punctate calcification is seen at the pancreatic head on image 65 series 2. There is mild dilatation of the proximal pancreatic duct for instance on image 48 series 2 measuring 5 mm wide. There is diffuse pancreatic edema with moderate surrounding stranding extending to the right anterior pararenal space and right paracolic gutter. No organize fluid collection identified. This is consistent with acute pancreatitis with possible obstructive duct stone.  2. Air fluid level alongside the 2nd duodenum likely a duodenal diverticulum.  3. Details and other findings as discussed above.                                         US Abdomen Limited (Preliminary result)  Result time 06/17/25 23:29:03      Preliminary result by Esteban Pro Jr., MD (06/17/25 23:29:03)                   Narrative:    START OF REPORT:  Technique: Limited right upper quadrant abdominal ultrasound was performed.    Clinical History: Abdominal pain; RUQ/epigastric pain.    Findings:  Liver: The liver appears moderately enlarged and measures 22.4 centimeters in greatest dimension and demonstrates increased echogenicity which may reflect fatty infiltration. The liver otherwise appears unremarkable.  Biliary ducts: The common bile duct is moderate to severely dilated measuring 15 mm.  Gallbladder:  Moderate echogenic material is seen in the gallbladder consistent with tiny stones having an aggregate measurement of 4.2 x 1.9 cm. Moderate wall thickening is seen with the wall measuring 4.7 mm in thickness. The sonographic Mooreton sign is positive.  Kidneys: No stones, hydronephrosis or mass.  Right kidney:  Dimensions: The right kidney measures 13.7 cm in sagittal dimension and appears unremarkable.      Impression:  1. Moderate echogenic material is seen in the gallbladder consistent with tiny stones having an aggregate measurement of 4.2 x 1.9 cm. Moderate wall thickening is seen with the wall measuring 4.7 mm in thickness. The sonographic Mooreton sign is positive. These findings are consistent with calculous cholecystitis.  2. The common bile duct is moderate to severely dilated measuring 15 mm. This is consistent with biliary ectasia probably from a distal common bile duct stone.  3. Details and other findings as above.                                         Medications   lactated ringers infusion (1,000 mLs Intravenous New Bag 6/17/25 2211)   ondansetron injection 4 mg (4 mg Intravenous Given 6/17/25 2211)   morphine injection 8 mg (8 mg Intravenous Given 6/17/25 2211)   potassium chloride SA CR tablet 40 mEq (40 mEq Oral Given 6/17/25 2259)   potassium chloride 10 mEq in 100 mL IVPB (10 mEq Intravenous New Bag 6/18/25 0047)   piperacillin-tazobactam (ZOSYN) 4.5 g in D5W 100 mL IVPB (MB+) (0 g Intravenous Stopped 6/18/25 0031)   iohexoL (OMNIPAQUE 350) injection 93 mL (93 mLs Intravenous Given 6/17/25 2317)   morphine injection 8 mg (8 mg Intravenous Given 6/17/25 2351)   ondansetron injection 4 mg (4 mg Intravenous Given 6/17/25 2351)     10:56 PM   Preliminary u/s results:  CBD dilated at 1.5 cm  Large stone vs. 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  This facility does not have GI  on-call at this time.  Working on appropriate transfer. Improved symptoms.    11:33 PM He has changed his mind and is now requesting to stay closer to this hospital to minimize drive time.    1:27 AM Accepted for transfer - [facility] - Dr. Hammer.    Medical Decision Making  Sudden onset abdominal pain with nausea and vomiting, found to have acute gallstone pancreatitis by preliminary data, seeking appropriate transfer as this facility does not have Gastroenterology on-call at this time.    Problems Addressed:  Gallstone pancreatitis: acute illness or injury    Amount and/or Complexity of Data Reviewed  Labs: ordered. Decision-making details documented in ED Course.  Radiology: ordered. Decision-making details documented in ED Course.  ECG/medicine tests: ordered and independent interpretation performed. Decision-making details documented in ED Course.    Risk  Prescription drug management.  Parenteral controlled substances.  Decision regarding hospitalization.      Additional MDM:   Differential Diagnosis:   Biliary colic, acute cholecystitis, pancreatitis, gallstone pancreatitis among many others                                    Clinical Impression:  Final diagnoses:  [R10.13] Epigastric abdominal pain  [E87.6] Hypokalemia  [K85.10] Gallstone pancreatitis (Primary)  [K80.01] Calculus of gallbladder with acute cholecystitis and obstruction          ED Disposition Condition    Transfer to Another Facility Stable                      [1]   Social History  Tobacco Use    Smoking status: Former     Current packs/day: 0.00     Average packs/day: 0.5 packs/day for 20.0 years (10.0 ttl pk-yrs)     Types: Cigarettes     Start date: 1980     Quit date: 2000     Years since quittin.0    Smokeless tobacco: Never   Substance Use Topics    Alcohol use: Yes     Alcohol/week: 42.0 standard drinks of alcohol     Types: 42 Glasses of wine per week    Drug use: Yes     Types: Marijuana        Keyur,  Alex CRENSHAW MD  06/18/25 0128

## 2025-06-18 NOTE — TELEPHONE ENCOUNTER
Spoke with pt, pt is currently in er in  for acute pancreatitis and gallstones and will be there for a few more days. Pt stated that he has a urologist and will be going to an appt with them as well. Advised pt to keep appt with Ms Fletcher but if he could not make it for any reason please call us to let us know. Pt agreed and conversation was ended

## 2025-06-23 ENCOUNTER — OFFICE VISIT (OUTPATIENT)
Dept: FAMILY MEDICINE | Facility: CLINIC | Age: 65
End: 2025-06-23
Payer: MEDICARE

## 2025-06-23 ENCOUNTER — PATIENT MESSAGE (OUTPATIENT)
Dept: ADMINISTRATIVE | Facility: CLINIC | Age: 65
End: 2025-06-23
Payer: MEDICARE

## 2025-06-23 ENCOUNTER — PATIENT OUTREACH (OUTPATIENT)
Dept: ADMINISTRATIVE | Facility: CLINIC | Age: 65
End: 2025-06-23
Payer: MEDICARE

## 2025-06-23 ENCOUNTER — PATIENT OUTREACH (OUTPATIENT)
Dept: ADMINISTRATIVE | Facility: HOSPITAL | Age: 65
End: 2025-06-23
Payer: MEDICARE

## 2025-06-23 VITALS
HEART RATE: 61 BPM | WEIGHT: 287.81 LBS | SYSTOLIC BLOOD PRESSURE: 124 MMHG | DIASTOLIC BLOOD PRESSURE: 80 MMHG | OXYGEN SATURATION: 98 % | BODY MASS INDEX: 40.14 KG/M2

## 2025-06-23 DIAGNOSIS — E87.6 HYPOKALEMIA: ICD-10-CM

## 2025-06-23 DIAGNOSIS — K85.90 ACUTE PANCREATITIS, UNSPECIFIED COMPLICATION STATUS, UNSPECIFIED PANCREATITIS TYPE: ICD-10-CM

## 2025-06-23 DIAGNOSIS — I10 HYPERTENSION, ESSENTIAL: Primary | ICD-10-CM

## 2025-06-23 PROCEDURE — 1159F MED LIST DOCD IN RCRD: CPT | Mod: CPTII,S$GLB,, | Performed by: NURSE PRACTITIONER

## 2025-06-23 PROCEDURE — 3044F HG A1C LEVEL LT 7.0%: CPT | Mod: CPTII,S$GLB,, | Performed by: NURSE PRACTITIONER

## 2025-06-23 PROCEDURE — 3008F BODY MASS INDEX DOCD: CPT | Mod: CPTII,S$GLB,, | Performed by: NURSE PRACTITIONER

## 2025-06-23 PROCEDURE — 3288F FALL RISK ASSESSMENT DOCD: CPT | Mod: CPTII,S$GLB,, | Performed by: NURSE PRACTITIONER

## 2025-06-23 PROCEDURE — 1101F PT FALLS ASSESS-DOCD LE1/YR: CPT | Mod: CPTII,S$GLB,, | Performed by: NURSE PRACTITIONER

## 2025-06-23 PROCEDURE — 3074F SYST BP LT 130 MM HG: CPT | Mod: CPTII,S$GLB,, | Performed by: NURSE PRACTITIONER

## 2025-06-23 PROCEDURE — 3079F DIAST BP 80-89 MM HG: CPT | Mod: CPTII,S$GLB,, | Performed by: NURSE PRACTITIONER

## 2025-06-23 PROCEDURE — 99214 OFFICE O/P EST MOD 30 MIN: CPT | Mod: S$GLB,,, | Performed by: NURSE PRACTITIONER

## 2025-06-23 PROCEDURE — 99999 PR PBB SHADOW E&M-EST. PATIENT-LVL III: CPT | Mod: PBBFAC,,, | Performed by: NURSE PRACTITIONER

## 2025-06-23 PROCEDURE — 1126F AMNT PAIN NOTED NONE PRSNT: CPT | Mod: CPTII,S$GLB,, | Performed by: NURSE PRACTITIONER

## 2025-06-23 RX ORDER — AMLODIPINE BESYLATE 5 MG/1
5 TABLET ORAL DAILY
Qty: 90 TABLET | Refills: 3 | Status: SHIPPED | OUTPATIENT
Start: 2025-06-23 | End: 2026-06-23

## 2025-06-23 RX ORDER — POTASSIUM CHLORIDE 750 MG/1
10 TABLET, EXTENDED RELEASE ORAL ONCE
Qty: 1 TABLET | Refills: 0 | Status: SHIPPED | OUTPATIENT
Start: 2025-06-23 | End: 2025-06-23 | Stop reason: SDUPTHER

## 2025-06-23 RX ORDER — POTASSIUM CHLORIDE 750 MG/1
10 TABLET, EXTENDED RELEASE ORAL DAILY
Qty: 90 TABLET | Refills: 1 | Status: SHIPPED | OUTPATIENT
Start: 2025-06-23

## 2025-06-23 RX ORDER — HYDROCHLOROTHIAZIDE 25 MG/1
25 TABLET ORAL DAILY
Qty: 90 TABLET | Refills: 2 | Status: SHIPPED | OUTPATIENT
Start: 2025-06-23

## 2025-06-23 RX ORDER — METOPROLOL SUCCINATE 25 MG/1
25 TABLET, EXTENDED RELEASE ORAL DAILY
Qty: 90 TABLET | Refills: 3 | Status: SHIPPED | OUTPATIENT
Start: 2025-06-23

## 2025-06-23 NOTE — PROGRESS NOTES
Subjective:       Patient ID: Grupo Givens is a 64 y.o. male.    Chief Complaint: Follow-up    History of Present Illness    CHIEF COMPLAINT:  Mr. Givens presents today for follow up after recent hospitalization for acute pancreatitis.    ACUTE PANCREATITIS:  He reports a 3-day hospitalization at  [facility] last week from 6/18-6/20 for acute pancreatitis.Hospital staff recommended gallbladder removal due to cholelithiasis, which pt declined. He believes he passed the gall stones, feels he saw them in his stool.  He was discharged Friday morning against initial medical advice. He attributes the condition to poor dietary habits, including excessive consumption of wine, steak, ribs, sweets, and minimal vegetable intake. He notes significant improvement in symptoms since changing his diet, including weight loss of 16 lbs and decreased swelling. He has discontinued alcohol intake and plans to follow up with gastroenterologist on . While hospitalized he received IV zosyn but did not accept all of the doses due to diarrhea. He has an appt with GI in 2 days as well as abd US in 2 days.     HYPERTENSION:  Home blood pressure readings using a wrist monitor show most recent reading of 121/60-70, lower than his typical baseline of 140/85-90. He continues amlodipine 5mg, metoprolol 25mg daily, and hydrochlorothiazide 25mg qd daily for blood pressure management. He has maintained home monitoring over past 2-3 weeks. BP has been normal at home per patient.     LEG CONDITION:  He reports significant improvement in previously noted rough and crackly leg skin, which has now smoothed out with decreased swelling. Previous course of antibiotics prescribed by Dr. Melendez appears to have been effective. He confirms compliance with leg elevation while sitting in recliner.    Portions of this note were generated with the assistance of ambient listening technology.      ROS:  Cardiovascular: -lower extremity  edema  Gastrointestinal: -nausea, -vomiting       Review of patient's allergies indicates:   Allergen Reactions    Lisinopril Other (See Comments)     Social Drivers of Health     Tobacco Use: Medium Risk (6/23/2025)    Patient History     Smoking Tobacco Use: Former     Smokeless Tobacco Use: Never     Passive Exposure: Not on file   Alcohol Use: Alcohol Misuse (6/18/2025)    [facility]    AUDIT-C     Frequency of Alcohol Consumption: 4 or more times a week     Average Number of Drinks: 1 or 2     Frequency of Binge Drinking: Weekly   Financial Resource Strain: Not on file   Food Insecurity: Not on file   Transportation Needs: Not on file   Physical Activity: Not on file   Stress: Not on file   Housing Stability: Not on file   Depression: Not at risk (6/18/2025)    Received from [facility]    PHQ-2     PHQ-2 Total: 0   Utilities: Not on file   Health Literacy: Not on file   Social Isolation: Not on file      Past Medical History:   Diagnosis Date    BPH (benign prostatic hyperplasia)     Essential (primary) hypertension       Past Surgical History:   Procedure Laterality Date    ADENOIDECTOMY      TONSILLECTOMY        Social History[1]    Current Medications[2]      Objective:      Vitals:    06/23/25 1610 06/23/25 1626   BP: (!) 150/86 124/80   BP Location: Left arm Left arm   Patient Position: Sitting Sitting   Pulse: 61    SpO2: 98%    Weight: 130.6 kg (287 lb 13 oz)       Physical Exam    Assessment:       1. Hypertension, essential    2. Hypokalemia    3. Acute pancreatitis, unspecified complication status, unspecified pancreatitis type        Plan:       Assessment & Plan    IMPRESSION:  - Assessed recent hospitalization for acute pancreatitis and potential gallstones.  - Evaluated BP management, noting lower readings at home.  - Opted to wait for  gastroenterologist consultation before ordering additional tests or changing treatment plan.    Portions of this note were generated with the assistance of ambient listening technology.    PLAN SUMMARY:  - Ordered lipase level to assess pancreatic function  - Ordered potassium level due to previous low readings  - Continue hydrochlorothiazide daily  - Continue amlodipine 5 mg daily (decreased from previous dose)  - Continue metoprolol 25 mg daily  - Advised to continue dietary modifications, alcohol abstinence, and lifestyle changes  - Keep legs elevated when sitting or watching TV  - Mr. Givens to go to imaging center for lab work  - Will send prescription for potassium supplements if needed based on test results  - Will refill medications at pharmacy  - Mr. Givens to see gastroenterologist for further evaluation and possible ultrasound  - Follow-up with gastroenterologist for further evaluation  - Follow-up with provider in 3 months    ACUTE PANCREATITIS:  - Monitored the patient's condition who spent 3 days in the hospital with acute pancreatitis.  - Lipase level was 2000 when admitted and decreased to 359 before discharge, but remained elevated.  - Ordered lipase level to assess current pancreatic function.  - Mr. Givens reports feeling amazing after changing diet and losing 16 lbs, with no pain, nausea, or vomiting currently.    GALLSTONES:  - Mr. Givens plans to see gastroenterologist on Wednesday for further evaluation and possible ultrasound to check for gallstones clearance.  - Mr. Givens believes they passed all gallstones, having observed small, hard, black seed-like objects in stool.  - Explained potential path of gallstones through digestive system.  - No current symptoms related to gallstones after dietary changes.    HYPERTENSION:  - Blood pressure measures 121/60-70 at home. High reading upon arrival to clinic, but I rechecked manually and bp was 124/80.  - Continued hydrochlorothiazide daily, amlodipine  5 mg daily (decreased from previous dose), and metoprolol 25 mg daily.  - Planned to recheck blood pressure and adjust medications as needed.  - Will refill medications at pharmacy.    HYPOKALEMIA:  - Monitored potassium levels which were low during hospitalization, requiring supplementation with 2 pills one day and 3 pills the next.  - Discussed relationship between hydrochlorothiazide and potassium levels.  - Ordered potassium level due to previous low readings.  - Will send prescription for potassium supplements if needed based on test results.    PERSONAL HISTORY OF PANCREATITIS AND GALLSTONES:  - Mr. Givens has a history of gallstones and pancreatitis, with recent hospitalization for acute pancreatitis.  - Currently reports no symptoms related to past conditions after dietary changes.  - Will follow up with gastroenterologist in 2 days, has US abd scheduled in 2 days, for further evaluation.    GENERAL MANAGEMENT AND FOLLOW-UP:  - Advised patient to continue with dietary modifications, alcohol abstinence, and lifestyle changes to prevent recurrence of gallstones and pancreatitis.  - Mr. Givens to keep legs elevated when sitting or watching TV.  - Mr. Givens to go to imaging center tomorrow morning for lab work.  - Follow up in 3 months.       Grupo was seen today for follow-up.    Diagnoses and all orders for this visit:    Hypertension, essential  -     hydroCHLOROthiazide (HYDRODIURIL) 25 MG tablet; Take 1 tablet (25 mg total) by mouth once daily.  -     amLODIPine (NORVASC) 5 MG tablet; Take 1 tablet (5 mg total) by mouth once daily.  -     metoprolol succinate (TOPROL-XL) 25 MG 24 hr tablet; Take 1 tablet (25 mg total) by mouth once daily.    Hypokalemia  -     Discontinue: potassium chloride (KLOR-CON) 10 MEQ TbSR; Take 1 tablet (10 mEq total) by mouth once. for 1 dose  -     potassium chloride (KLOR-CON) 10 MEQ TbSR; Take 1 tablet (10 mEq total) by mouth once daily.    Acute pancreatitis, unspecified  complication status, unspecified pancreatitis type  -     LIPASE; Future               Follow up in about 3 months (around 9/23/2025).    Future Appointments       Date Provider Specialty Appt Notes    9/23/2025 Brenda Powell FNP-C Family Medicine 3 month fu            This note was generated with the assistance of ambient listening technology. Verbal consent was obtained by the patient and accompanying visitor(s) for the recording of patient appointment to facilitate this note. I attest to having reviewed and edited the generated note for accuracy, though some syntax or spelling errors may persist. Please contact the author of this note for any clarification.      ALISON Villatoro  Family Medicine         [1]   Social History  Socioeconomic History    Marital status:      Spouse name: Brandie    Number of children: 3   [2]   Current Outpatient Medications:     albuterol (PROVENTIL/VENTOLIN HFA) 90 mcg/actuation inhaler, Inhale 2 puffs into the lungs every 6 (six) hours as needed for Wheezing. Rescue, Disp: 18 g, Rfl: 0    tamsulosin (FLOMAX) 0.4 mg Cap, Take 1 capsule by mouth., Disp: , Rfl:     amLODIPine (NORVASC) 5 MG tablet, Take 1 tablet (5 mg total) by mouth once daily., Disp: 90 tablet, Rfl: 3    hydroCHLOROthiazide (HYDRODIURIL) 25 MG tablet, Take 1 tablet (25 mg total) by mouth once daily., Disp: 90 tablet, Rfl: 2    metoprolol succinate (TOPROL-XL) 25 MG 24 hr tablet, Take 1 tablet (25 mg total) by mouth once daily., Disp: 90 tablet, Rfl: 3    potassium chloride (KLOR-CON) 10 MEQ TbSR, Take 1 tablet (10 mEq total) by mouth once daily., Disp: 90 tablet, Rfl: 1  No current facility-administered medications for this visit.

## 2025-06-23 NOTE — PROGRESS NOTES
C3 nurse attempted to contact Grupo Givens  for a TCC post hospital discharge follow up call. No answer, LVM . The patient has a scheduled HOSFU appointment with CARINA Powell  on 06/23/2025 @ [facility]

## 2025-06-24 DIAGNOSIS — K85.90 PANCREATITIS: Primary | ICD-10-CM

## 2025-06-24 NOTE — PROGRESS NOTES
2nd attempt C3 nurse attempted to contact Grupo Anil Givens for a TCC post hospital discharge follow up call. No answer, voicemail left with instructions to return call. The patient had a scheduled HOSFU appointment with Brenda Powell FNP-C on 6/23 at 1630.

## 2025-06-25 ENCOUNTER — HOSPITAL ENCOUNTER (OUTPATIENT)
Dept: RADIOLOGY | Facility: HOSPITAL | Age: 65
Discharge: HOME | End: 2025-06-25
Attending: NURSE PRACTITIONER
Payer: MEDICARE

## 2025-06-25 ENCOUNTER — RESULTS FOLLOW-UP (OUTPATIENT)
Dept: FAMILY MEDICINE | Facility: CLINIC | Age: 65
End: 2025-06-25

## 2025-06-25 DIAGNOSIS — K85.90 PANCREATITIS: ICD-10-CM

## 2025-06-25 PROCEDURE — 76700 US EXAM ABDOM COMPLETE: CPT | Mod: 26,,, | Performed by: RADIOLOGY

## 2025-06-25 PROCEDURE — 76700 US EXAM ABDOM COMPLETE: CPT | Mod: TC,PO

## 2025-06-25 NOTE — PROGRESS NOTES
3rd attempt C3 nurse attempted to contact Grupo Anil Givens for a TCC post hospital discharge follow up call. No answer, voicemail left with instructions to return call. The patient had a scheduled HOSFU appointment with Brenda Powell FNP-C on 6/23 at 1630.

## 2025-06-26 ENCOUNTER — LAB VISIT (OUTPATIENT)
Dept: LAB | Facility: HOSPITAL | Age: 65
End: 2025-06-26
Attending: NURSE PRACTITIONER
Payer: MEDICARE

## 2025-06-26 ENCOUNTER — TELEPHONE (OUTPATIENT)
Dept: SURGERY | Facility: CLINIC | Age: 65
End: 2025-06-26
Payer: MEDICARE

## 2025-06-26 DIAGNOSIS — K85.90 ACUTE PANCREATITIS: Primary | ICD-10-CM

## 2025-06-26 LAB
ABSOLUTE EOSINOPHIL (SMH): 0.07 K/UL
ABSOLUTE MONOCYTE (SMH): 0.54 K/UL (ref 0.3–1)
ABSOLUTE NEUTROPHIL COUNT (SMH): 3.9 K/UL (ref 1.8–7.7)
ALBUMIN SERPL-MCNC: 4.5 G/DL (ref 3.5–5.2)
ALP SERPL-CCNC: 35 UNIT/L (ref 55–135)
ALT SERPL-CCNC: 19 UNIT/L (ref 10–44)
ANION GAP (SMH): 9 MMOL/L (ref 8–16)
AST SERPL-CCNC: 21 UNIT/L (ref 10–40)
BASOPHILS # BLD AUTO: 0.03 K/UL
BASOPHILS NFR BLD AUTO: 0.5 %
BILIRUB SERPL-MCNC: 0.9 MG/DL (ref 0.1–1)
BUN SERPL-MCNC: 13 MG/DL (ref 8–23)
CALCIUM SERPL-MCNC: 10.2 MG/DL (ref 8.7–10.5)
CHLORIDE SERPL-SCNC: 96 MMOL/L (ref 95–110)
CO2 SERPL-SCNC: 29 MMOL/L (ref 23–29)
CREAT SERPL-MCNC: 0.7 MG/DL (ref 0.5–1.4)
ERYTHROCYTE [DISTWIDTH] IN BLOOD BY AUTOMATED COUNT: 13.2 % (ref 11.5–14.5)
GFR SERPLBLD CREATININE-BSD FMLA CKD-EPI: >60 ML/MIN/1.73/M2
GLUCOSE SERPL-MCNC: 106 MG/DL (ref 70–110)
HCT VFR BLD AUTO: 44.7 % (ref 40–54)
HGB BLD-MCNC: 15.6 GM/DL (ref 14–18)
IMM GRANULOCYTES # BLD AUTO: 0.03 K/UL (ref 0–0.04)
IMM GRANULOCYTES NFR BLD AUTO: 0.5 % (ref 0–0.5)
LIPASE SERPL-CCNC: 89 U/L (ref 4–60)
LYMPHOCYTES # BLD AUTO: 0.99 K/UL (ref 1–4.8)
MCH RBC QN AUTO: 33.1 PG (ref 27–31)
MCHC RBC AUTO-ENTMCNC: 34.9 G/DL (ref 32–36)
MCV RBC AUTO: 95 FL (ref 82–98)
NUCLEATED RBC (/100WBC) (SMH): 0 /100 WBC
PLATELET # BLD AUTO: 302 K/UL (ref 150–450)
PMV BLD AUTO: 9.8 FL (ref 9.2–12.9)
POTASSIUM SERPL-SCNC: 3.5 MMOL/L (ref 3.5–5.1)
PROT SERPL-MCNC: 7.5 GM/DL (ref 6–8.4)
RBC # BLD AUTO: 4.72 M/UL (ref 4.6–6.2)
RELATIVE EOSINOPHIL (SMH): 1.3 % (ref 0–8)
RELATIVE LYMPHOCYTE (SMH): 17.8 % (ref 18–48)
RELATIVE MONOCYTE (SMH): 9.7 % (ref 4–15)
RELATIVE NEUTROPHIL (SMH): 70.2 % (ref 38–73)
SODIUM SERPL-SCNC: 134 MMOL/L (ref 136–145)
WBC # BLD AUTO: 5.56 K/UL (ref 3.9–12.7)

## 2025-06-26 PROCEDURE — 83690 ASSAY OF LIPASE: CPT

## 2025-06-26 PROCEDURE — 36415 COLL VENOUS BLD VENIPUNCTURE: CPT | Mod: PO

## 2025-06-26 PROCEDURE — 85025 COMPLETE CBC W/AUTO DIFF WBC: CPT

## 2025-06-26 PROCEDURE — 82040 ASSAY OF SERUM ALBUMIN: CPT

## 2025-06-26 NOTE — TELEPHONE ENCOUNTER
Referral received from [facility] regarding gallstone pancreatitis, offered appointment with Dr Muniz on Thursday, July 10 at 11 am which he accepted.  Offered earlier appointments with another provider but prefers to stay with Dr Muniz unless things change.

## 2025-07-10 ENCOUNTER — OFFICE VISIT (OUTPATIENT)
Dept: SURGERY | Facility: CLINIC | Age: 65
End: 2025-07-10
Payer: MEDICARE

## 2025-07-10 VITALS
DIASTOLIC BLOOD PRESSURE: 72 MMHG | BODY MASS INDEX: 40.31 KG/M2 | WEIGHT: 287.94 LBS | HEART RATE: 55 BPM | TEMPERATURE: 98 F | HEIGHT: 71 IN | SYSTOLIC BLOOD PRESSURE: 126 MMHG

## 2025-07-10 DIAGNOSIS — K85.10 GALLSTONE PANCREATITIS: Primary | ICD-10-CM

## 2025-07-10 DIAGNOSIS — K42.9 UMBILICAL HERNIA WITHOUT OBSTRUCTION AND WITHOUT GANGRENE: ICD-10-CM

## 2025-07-10 PROCEDURE — 1159F MED LIST DOCD IN RCRD: CPT | Mod: CPTII,HCNC,S$GLB, | Performed by: SURGERY

## 2025-07-10 PROCEDURE — 3008F BODY MASS INDEX DOCD: CPT | Mod: CPTII,HCNC,S$GLB, | Performed by: SURGERY

## 2025-07-10 PROCEDURE — 99204 OFFICE O/P NEW MOD 45 MIN: CPT | Mod: HCNC,S$GLB,, | Performed by: SURGERY

## 2025-07-10 PROCEDURE — 3078F DIAST BP <80 MM HG: CPT | Mod: CPTII,HCNC,S$GLB, | Performed by: SURGERY

## 2025-07-10 PROCEDURE — 1160F RVW MEDS BY RX/DR IN RCRD: CPT | Mod: CPTII,HCNC,S$GLB, | Performed by: SURGERY

## 2025-07-10 PROCEDURE — 3074F SYST BP LT 130 MM HG: CPT | Mod: CPTII,HCNC,S$GLB, | Performed by: SURGERY

## 2025-07-10 PROCEDURE — 3044F HG A1C LEVEL LT 7.0%: CPT | Mod: CPTII,HCNC,S$GLB, | Performed by: SURGERY

## 2025-07-10 PROCEDURE — 99999 PR PBB SHADOW E&M-EST. PATIENT-LVL IV: CPT | Mod: PBBFAC,,, | Performed by: SURGERY

## 2025-07-10 PROCEDURE — 1126F AMNT PAIN NOTED NONE PRSNT: CPT | Mod: CPTII,HCNC,S$GLB, | Performed by: SURGERY

## 2025-07-10 RX ORDER — CEFAZOLIN SODIUM 2 G/50ML
2 SOLUTION INTRAVENOUS
OUTPATIENT
Start: 2025-07-10

## 2025-07-10 NOTE — PROGRESS NOTES
Subjective:       Patient ID: Grupo Givens is a 64 y.o. male.    Chief Complaint: Gall Bladder Problem      HPI:  64 year old male recently admitted with gallstone pancreatitis in late June. Presented with sudden onset severe epigastric pain. Lipase over 2000. LFT's elevated with tbili reaching 2.1. LFT's normalized. Symptoms resolved. MRCP negative for CBD filling defect or lesion.  Feeling well today.  Has had no issues since discharge.  No fevers.  No jaundice.  No pain.  Referred to the office for cholecystectomy.  Patient also reports small umbilical hernia.  Present for several years.  No previous abdominal surgeries.    Past Medical History:   Diagnosis Date    BPH (benign prostatic hyperplasia)     Essential (primary) hypertension      Past Surgical History:   Procedure Laterality Date    ADENOIDECTOMY      TONSILLECTOMY       Review of patient's allergies indicates:   Allergen Reactions    Lisinopril Other (See Comments)     Medication List with Changes/Refills   Current Medications    ALBUTEROL (PROVENTIL/VENTOLIN HFA) 90 MCG/ACTUATION INHALER    Inhale 2 puffs into the lungs every 6 (six) hours as needed for Wheezing. Rescue    AMLODIPINE (NORVASC) 5 MG TABLET    Take 1 tablet (5 mg total) by mouth once daily.    HYDROCHLOROTHIAZIDE (HYDRODIURIL) 25 MG TABLET    Take 1 tablet (25 mg total) by mouth once daily.    METOPROLOL SUCCINATE (TOPROL-XL) 25 MG 24 HR TABLET    Take 1 tablet (25 mg total) by mouth once daily.    POTASSIUM CHLORIDE (KLOR-CON) 10 MEQ TBSR    Take 1 tablet (10 mEq total) by mouth once daily.    TAMSULOSIN (FLOMAX) 0.4 MG CAP    Take 1 capsule by mouth.     Family History   Problem Relation Name Age of Onset    Heart disease Mother  73        MI    Depression Mother      Heart disease Father          valve disease    Alzheimer's disease Father      Heart disease Maternal Grandmother          enlarged heart    Cancer Maternal Grandfather      Alcohol abuse Maternal Grandfather       Heart disease Maternal Grandfather      Alzheimer's disease Paternal Grandmother       Social History     Socioeconomic History    Marital status:      Spouse name: Brandie    Number of children: 3   Occupational History    Occupation: Owner of [employer]   Tobacco Use    Smoking status: Former     Current packs/day: 0.00     Average packs/day: 0.5 packs/day for 20.0 years (10.0 ttl pk-yrs)     Types: Cigarettes     Start date: 1980     Quit date: 2000     Years since quittin.1    Smokeless tobacco: Never   Substance and Sexual Activity    Alcohol use: Yes     Alcohol/week: 42.0 standard drinks of alcohol     Types: 42 Glasses of wine per week    Drug use: Yes     Types: Marijuana    Sexual activity: Yes     Partners: Female   Social History Narrative    2 dogs          Review of Systems   Constitutional:  Negative for appetite change, chills, fever and unexpected weight change.   HENT:  Negative for hearing loss, rhinorrhea, sore throat and voice change.    Eyes:  Negative for photophobia and visual disturbance.   Respiratory:  Negative for cough, choking and shortness of breath.    Cardiovascular:  Negative for chest pain, palpitations and leg swelling.   Gastrointestinal:  Negative for abdominal pain, blood in stool, constipation, diarrhea, nausea and vomiting.   Endocrine: Negative for cold intolerance, heat intolerance, polydipsia and polyuria.   Musculoskeletal:  Negative for arthralgias, back pain, joint swelling and neck stiffness.   Skin:  Negative for color change, pallor and rash.   Neurological:  Negative for dizziness, seizures, syncope and headaches.   Hematological:  Negative for adenopathy. Does not bruise/bleed easily.   Psychiatric/Behavioral:  Negative for agitation, behavioral problems and confusion.        Objective:      Physical Exam  Constitutional:       General: He is not in acute distress.     Appearance: Normal appearance. He is not toxic-appearing.   Pulmonary:       Effort: No tachypnea, bradypnea or respiratory distress.   Abdominal:      General: There is no distension.      Palpations: Abdomen is soft.      Tenderness: There is no abdominal tenderness.      Hernia: A hernia is present. Hernia is present in the umbilical area.          Comments: Small umbilical hernia    Neurological:      Mental Status: He is alert and oriented to person, place, and time.   Psychiatric:         Behavior: Behavior is cooperative.         Assessment/Plan:   Grupo was seen today for gall bladder problem.    Diagnoses and all orders for this visit:    Gallstone pancreatitis  -     Vital signs; Standing  -     Insert peripheral IV; Standing  -     Diet NPO; Standing  -     Place sequential compression device; Standing  -     Pulse Oximetry Q4H; Standing  -     Case Request Operating Room: CHOLECYSTECTOMY, LAPAROSCOPIC  -     Full code; Standing  -     Place in Outpatient; Standing  -     Insert peripheral IV    Umbilical hernia without obstruction and without gangrene    Other orders  -     IP VTE LOW RISK PATIENT; Standing  -     cefazolin (ANCEF) 2 gram in dextrose 5% 50 mL IVPB (premix)        Patient with episode of gallstone pancreatitis.  Plan for cholecystectomy Aug 1.  Will also perform primary repair of his umbilical hernia Ace surgery.    I discussed the proposed procedures with the patient including risks, benefits, indications, alternatives and special concerns.  The patient appears to understand and agrees to go ahead with surgery.  I have made no promises, warranties or verbal agreements beyond what was discussed above.    No follow-ups on file.

## 2025-08-01 ENCOUNTER — ANESTHESIA (OUTPATIENT)
Dept: SURGERY | Facility: HOSPITAL | Age: 65
End: 2025-08-01
Payer: MEDICARE

## 2025-08-01 ENCOUNTER — HOSPITAL ENCOUNTER (OUTPATIENT)
Facility: HOSPITAL | Age: 65
Discharge: HOME | End: 2025-08-01
Attending: SURGERY | Admitting: SURGERY
Payer: MEDICARE

## 2025-08-01 ENCOUNTER — ANESTHESIA EVENT (OUTPATIENT)
Dept: SURGERY | Facility: HOSPITAL | Age: 65
End: 2025-08-01
Payer: MEDICARE

## 2025-08-01 VITALS
HEART RATE: 71 BPM | SYSTOLIC BLOOD PRESSURE: 138 MMHG | DIASTOLIC BLOOD PRESSURE: 71 MMHG | OXYGEN SATURATION: 97 % | RESPIRATION RATE: 16 BRPM | TEMPERATURE: 98 F

## 2025-08-01 DIAGNOSIS — K85.10 GALLSTONE PANCREATITIS: ICD-10-CM

## 2025-08-01 PROCEDURE — 25000003 PHARM REV CODE 250: Performed by: STUDENT IN AN ORGANIZED HEALTH CARE EDUCATION/TRAINING PROGRAM

## 2025-08-01 PROCEDURE — 36000708 HC OR TIME LEV III 1ST 15 MIN: Performed by: SURGERY

## 2025-08-01 PROCEDURE — 63600175 PHARM REV CODE 636 W HCPCS: Mod: JZ | Performed by: STUDENT IN AN ORGANIZED HEALTH CARE EDUCATION/TRAINING PROGRAM

## 2025-08-01 PROCEDURE — 71000033 HC RECOVERY, INTIAL HOUR: Performed by: SURGERY

## 2025-08-01 PROCEDURE — 71000015 HC POSTOP RECOV 1ST HR: Performed by: SURGERY

## 2025-08-01 PROCEDURE — 71000039 HC RECOVERY, EACH ADD'L HOUR: Performed by: SURGERY

## 2025-08-01 PROCEDURE — 27201423 OPTIME MED/SURG SUP & DEVICES STERILE SUPPLY: Performed by: SURGERY

## 2025-08-01 PROCEDURE — 63600175 PHARM REV CODE 636 W HCPCS: Mod: JZ,TB | Performed by: SURGERY

## 2025-08-01 PROCEDURE — 25000003 PHARM REV CODE 250: Performed by: SURGERY

## 2025-08-01 PROCEDURE — 37000009 HC ANESTHESIA EA ADD 15 MINS: Performed by: SURGERY

## 2025-08-01 PROCEDURE — 63600175 PHARM REV CODE 636 W HCPCS: Performed by: STUDENT IN AN ORGANIZED HEALTH CARE EDUCATION/TRAINING PROGRAM

## 2025-08-01 PROCEDURE — 36000709 HC OR TIME LEV III EA ADD 15 MIN: Performed by: SURGERY

## 2025-08-01 PROCEDURE — 88304 TISSUE EXAM BY PATHOLOGIST: CPT | Mod: TC | Performed by: PATHOLOGY

## 2025-08-01 PROCEDURE — 71000016 HC POSTOP RECOV ADDL HR: Performed by: SURGERY

## 2025-08-01 PROCEDURE — 47562 LAPAROSCOPIC CHOLECYSTECTOMY: CPT | Mod: ,,, | Performed by: SURGERY

## 2025-08-01 PROCEDURE — 37000008 HC ANESTHESIA 1ST 15 MINUTES: Performed by: SURGERY

## 2025-08-01 RX ORDER — HYDROCODONE BITARTRATE AND ACETAMINOPHEN 5; 325 MG/1; MG/1
1 TABLET ORAL EVERY 6 HOURS PRN
Qty: 25 TABLET | Refills: 0 | Status: SHIPPED | OUTPATIENT
Start: 2025-08-01

## 2025-08-01 RX ORDER — ONDANSETRON HYDROCHLORIDE 2 MG/ML
4 INJECTION, SOLUTION INTRAVENOUS DAILY PRN
Status: DISCONTINUED | OUTPATIENT
Start: 2025-08-01 | End: 2025-08-01 | Stop reason: HOSPADM

## 2025-08-01 RX ORDER — FAMOTIDINE 10 MG/ML
INJECTION, SOLUTION INTRAVENOUS
Status: DISCONTINUED | OUTPATIENT
Start: 2025-08-01 | End: 2025-08-01

## 2025-08-01 RX ORDER — LIDOCAINE HYDROCHLORIDE 20 MG/ML
INJECTION, SOLUTION EPIDURAL; INFILTRATION; INTRACAUDAL; PERINEURAL
Status: DISCONTINUED | OUTPATIENT
Start: 2025-08-01 | End: 2025-08-01

## 2025-08-01 RX ORDER — GLUCAGON 1 MG
1 KIT INJECTION
Status: DISCONTINUED | OUTPATIENT
Start: 2025-08-01 | End: 2025-08-01 | Stop reason: HOSPADM

## 2025-08-01 RX ORDER — SODIUM CHLORIDE, SODIUM LACTATE, POTASSIUM CHLORIDE, CALCIUM CHLORIDE 600; 310; 30; 20 MG/100ML; MG/100ML; MG/100ML; MG/100ML
INJECTION, SOLUTION INTRAVENOUS CONTINUOUS PRN
Status: DISCONTINUED | OUTPATIENT
Start: 2025-08-01 | End: 2025-08-01

## 2025-08-01 RX ORDER — OXYCODONE HYDROCHLORIDE 5 MG/1
5 TABLET ORAL
Status: DISCONTINUED | OUTPATIENT
Start: 2025-08-01 | End: 2025-08-01 | Stop reason: HOSPADM

## 2025-08-01 RX ORDER — BUPIVACAINE HYDROCHLORIDE AND EPINEPHRINE 2.5; 5 MG/ML; UG/ML
INJECTION, SOLUTION EPIDURAL; INFILTRATION; INTRACAUDAL; PERINEURAL
Status: DISCONTINUED | OUTPATIENT
Start: 2025-08-01 | End: 2025-08-01 | Stop reason: HOSPADM

## 2025-08-01 RX ORDER — ACETAMINOPHEN 10 MG/ML
INJECTION, SOLUTION INTRAVENOUS
Status: DISCONTINUED | OUTPATIENT
Start: 2025-08-01 | End: 2025-08-01

## 2025-08-01 RX ORDER — HYDROMORPHONE HYDROCHLORIDE 1 MG/ML
0.2 INJECTION, SOLUTION INTRAMUSCULAR; INTRAVENOUS; SUBCUTANEOUS EVERY 5 MIN PRN
Status: DISCONTINUED | OUTPATIENT
Start: 2025-08-01 | End: 2025-08-01 | Stop reason: HOSPADM

## 2025-08-01 RX ORDER — ROCURONIUM BROMIDE 10 MG/ML
INJECTION, SOLUTION INTRAVENOUS
Status: DISCONTINUED | OUTPATIENT
Start: 2025-08-01 | End: 2025-08-01

## 2025-08-01 RX ORDER — MIDAZOLAM HYDROCHLORIDE 1 MG/ML
INJECTION INTRAMUSCULAR; INTRAVENOUS
Status: DISCONTINUED | OUTPATIENT
Start: 2025-08-01 | End: 2025-08-01

## 2025-08-01 RX ORDER — ONDANSETRON HYDROCHLORIDE 2 MG/ML
INJECTION, SOLUTION INTRAVENOUS
Status: DISCONTINUED | OUTPATIENT
Start: 2025-08-01 | End: 2025-08-01

## 2025-08-01 RX ORDER — CEFAZOLIN 2 G/1
2 INJECTION, POWDER, FOR SOLUTION INTRAMUSCULAR; INTRAVENOUS
Status: DISCONTINUED | OUTPATIENT
Start: 2025-08-01 | End: 2025-08-01 | Stop reason: HOSPADM

## 2025-08-01 RX ORDER — BUPIVACAINE 13.3 MG/ML
INJECTION, SUSPENSION, LIPOSOMAL INFILTRATION
Status: DISCONTINUED | OUTPATIENT
Start: 2025-08-01 | End: 2025-08-01 | Stop reason: HOSPADM

## 2025-08-01 RX ORDER — FENTANYL CITRATE 50 UG/ML
INJECTION, SOLUTION INTRAMUSCULAR; INTRAVENOUS
Status: DISCONTINUED | OUTPATIENT
Start: 2025-08-01 | End: 2025-08-01

## 2025-08-01 RX ORDER — SUCCINYLCHOLINE CHLORIDE 20 MG/ML
INJECTION INTRAMUSCULAR; INTRAVENOUS
Status: DISCONTINUED | OUTPATIENT
Start: 2025-08-01 | End: 2025-08-01

## 2025-08-01 RX ORDER — CEFAZOLIN SODIUM 1 G/3ML
INJECTION, POWDER, FOR SOLUTION INTRAMUSCULAR; INTRAVENOUS
Status: DISCONTINUED | OUTPATIENT
Start: 2025-08-01 | End: 2025-08-01

## 2025-08-01 RX ORDER — PROPOFOL 10 MG/ML
VIAL (ML) INTRAVENOUS
Status: DISCONTINUED | OUTPATIENT
Start: 2025-08-01 | End: 2025-08-01

## 2025-08-01 RX ORDER — MEPERIDINE HYDROCHLORIDE 50 MG/ML
12.5 INJECTION INTRAMUSCULAR; INTRAVENOUS; SUBCUTANEOUS EVERY 10 MIN PRN
Status: DISCONTINUED | OUTPATIENT
Start: 2025-08-01 | End: 2025-08-01 | Stop reason: HOSPADM

## 2025-08-01 RX ORDER — DEXAMETHASONE SODIUM PHOSPHATE 4 MG/ML
INJECTION, SOLUTION INTRA-ARTICULAR; INTRALESIONAL; INTRAMUSCULAR; INTRAVENOUS; SOFT TISSUE
Status: DISCONTINUED | OUTPATIENT
Start: 2025-08-01 | End: 2025-08-01

## 2025-08-01 RX ORDER — DIPHENHYDRAMINE HYDROCHLORIDE 50 MG/ML
12.5 INJECTION, SOLUTION INTRAMUSCULAR; INTRAVENOUS
Status: DISCONTINUED | OUTPATIENT
Start: 2025-08-01 | End: 2025-08-01 | Stop reason: HOSPADM

## 2025-08-01 RX ADMIN — MIDAZOLAM HYDROCHLORIDE 2 MG: 1 INJECTION, SOLUTION INTRAMUSCULAR; INTRAVENOUS at 09:08

## 2025-08-01 RX ADMIN — SUGAMMADEX 200 MG: 100 INJECTION, SOLUTION INTRAVENOUS at 10:08

## 2025-08-01 RX ADMIN — Medication 200 MG: at 09:08

## 2025-08-01 RX ADMIN — ROCURONIUM BROMIDE 5 MG: 10 INJECTION, SOLUTION INTRAVENOUS at 09:08

## 2025-08-01 RX ADMIN — ACETAMINOPHEN 1000 MG: 10 INJECTION, SOLUTION INTRAVENOUS at 09:08

## 2025-08-01 RX ADMIN — OXYCODONE HYDROCHLORIDE 5 MG: 5 TABLET ORAL at 10:08

## 2025-08-01 RX ADMIN — FENTANYL CITRATE 100 MCG: 50 INJECTION, SOLUTION INTRAMUSCULAR; INTRAVENOUS at 09:08

## 2025-08-01 RX ADMIN — ONDANSETRON 4 MG: 2 INJECTION INTRAMUSCULAR; INTRAVENOUS at 09:08

## 2025-08-01 RX ADMIN — SODIUM CHLORIDE, SODIUM LACTATE, POTASSIUM CHLORIDE, AND CALCIUM CHLORIDE: .6; .31; .03; .02 INJECTION, SOLUTION INTRAVENOUS at 09:08

## 2025-08-01 RX ADMIN — DEXAMETHASONE SODIUM PHOSPHATE 8 MG: 4 INJECTION, SOLUTION INTRAMUSCULAR; INTRAVENOUS at 09:08

## 2025-08-01 RX ADMIN — HYDROMORPHONE HYDROCHLORIDE 0.2 MG: 1 INJECTION, SOLUTION INTRAMUSCULAR; INTRAVENOUS; SUBCUTANEOUS at 10:08

## 2025-08-01 RX ADMIN — HYDROMORPHONE HYDROCHLORIDE 0.2 MG: 1 INJECTION, SOLUTION INTRAMUSCULAR; INTRAVENOUS; SUBCUTANEOUS at 11:08

## 2025-08-01 RX ADMIN — FAMOTIDINE 20 MG: 10 INJECTION, SOLUTION INTRAVENOUS at 09:08

## 2025-08-01 RX ADMIN — PROPOFOL 170 MG: 10 INJECTION, EMULSION INTRAVENOUS at 09:08

## 2025-08-01 RX ADMIN — CEFAZOLIN 2 G: 330 INJECTION, POWDER, FOR SOLUTION INTRAMUSCULAR; INTRAVENOUS at 09:08

## 2025-08-01 RX ADMIN — LIDOCAINE HYDROCHLORIDE 100 MG: 20 INJECTION, SOLUTION INTRAVENOUS at 09:08

## 2025-08-01 NOTE — ANESTHESIA PREPROCEDURE EVALUATION
08/01/2025  Grupo Givens is a 64 y.o., male.      Problem List[1]    Past Surgical History:   Procedure Laterality Date    ADENOIDECTOMY  1963    TONSILLECTOMY  1963        Tobacco Use:  The patient  reports that he quit smoking about 25 years ago. His smoking use included cigarettes. He started smoking about 45 years ago. He has a 10 pack-year smoking history. He has never used smokeless tobacco.     Results for orders placed or performed during the hospital encounter of 06/17/25   EKG 12-lead    Collection Time: 06/17/25 10:00 PM   Result Value Ref Range    QRS Duration 140 ms    OHS QTC Calculation 435 ms    Narrative    Test Reason : R10.13,    Vent. Rate :  75 BPM     Atrial Rate :  75 BPM     P-R Int : 206 ms          QRS Dur : 140 ms      QT Int : 390 ms       P-R-T Axes :  72 -42  53 degrees    QTcB Int : 435 ms    Normal sinus rhythm  Left axis deviation  Left bundle branch block  Abnormal ECG  When compared with ECG of 07-Apr-2021 10:39,  No significant change was found  Confirmed by Zaid Reyes ([id number]) on 6/18/2025 12:37:49 PM    Referred By: AAAREFERRAL SELF           Confirmed By: Zaid Reyes             Lab Results   Component Value Date    WBC 5.56 06/26/2025    HGB 15.6 06/26/2025    HCT 44.7 06/26/2025    MCV 95 06/26/2025     06/26/2025     BMP  Lab Results   Component Value Date     (L) 06/26/2025    K 3.5 06/26/2025    CL 96 06/26/2025    CO2 29 06/26/2025    BUN 13 06/26/2025    CREATININE 0.7 06/26/2025    CALCIUM 10.2 06/26/2025    ANIONGAP 9 06/26/2025     06/26/2025     (H) 06/17/2025     (H) 06/17/2025       No results found for this or any previous visit.          Pre-op Assessment    I have reviewed the Patient Summary Reports.     I have reviewed the Nursing Notes. I have reviewed the NPO Status.   I have reviewed the Medications.     Review  of Systems  Anesthesia Hx:  No problems with previous Anesthesia             Denies Family Hx of Anesthesia complications.    Denies Personal Hx of Anesthesia complications.                    Social:  Former Smoker, Alcohol Use       Hematology/Oncology:  Hematology Normal   Oncology Normal                                   EENT/Dental:  EENT/Dental Normal           Cardiovascular:     Hypertension              ECG has been reviewed.                            Pulmonary:  Pulmonary Normal                       Renal/:    BPH              Hepatic/GI:  Hepatic/GI Normal                    Musculoskeletal:  Musculoskeletal Normal                Neurological:  Neurology Normal                                      Endocrine:  Endocrine Normal          Obesity / BMI > 30  Psych:  Psychiatric Normal                    Physical Exam  General: Well nourished, Cooperative, Alert and Oriented    Airway:  Mallampati: II   Mouth Opening: Normal  TM Distance: Normal  Tongue: Normal  Neck ROM: Normal ROM    Dental:  Intact    Chest/Lungs:  Clear to auscultation    Heart:  Rate: Normal  Rhythm: Regular Rhythm  Sounds: Normal        Anesthesia Plan  Type of Anesthesia, risks & benefits discussed:    Anesthesia Type: Gen ETT  Intra-op Monitoring Plan: Standard ASA Monitors  Post Op Pain Control Plan: multimodal analgesia  Induction:  IV  Airway Plan: Video and Direct  Informed Consent: Informed consent signed with the Patient and all parties understand the risks and agree with anesthesia plan.  All questions answered.   ASA Score: 3    Ready For Surgery From Anesthesia Perspective.     .           [1]   Patient Active Problem List  Diagnosis    Hypertension, essential    BPH (benign prostatic hyperplasia)    Class 3 obesity    Polyp of colon    Former smoker    BMI 39.0-39.9,adult    S/P tonsillectomy    History of DVT (deep vein thrombosis)    Venous insufficiency    Senile purpura    BMI 40.0-44.9, adult

## 2025-08-01 NOTE — ANESTHESIA PROCEDURE NOTES
Intubation    Date/Time: 8/1/2025 9:33 AM    Performed by: Rayna Bravo CRNA  Authorized by: Simón Billy MD    Intubation:     Induction:  Intravenous    Intubated:  Postinduction    Mask Ventilation:  Easy mask    Attempts:  1    Attempted By:  CRNA    Method of Intubation:  Video laryngoscopy    Blade:  [place] 4    Laryngeal View Grade: Grade I - full view of cords      Difficult Airway Encountered?: No      Complications:  None    Airway Device:  Oral endotracheal tube    Airway Device Size:  7.5    Style/Cuff Inflation:  Cuffed    Secured at:  The lips    Placement Verified By:  Capnometry    Complicating Factors:  None    Findings Post-Intubation:  BS equal bilateral and atraumatic/condition of teeth unchanged

## 2025-08-01 NOTE — ANESTHESIA POSTPROCEDURE EVALUATION
Anesthesia Post Evaluation    Patient: Grupo Givens    Procedure(s) Performed: Procedure(s) (LRB):  CHOLECYSTECTOMY, LAPAROSCOPIC (N/A)    Final Anesthesia Type: general      Patient location during evaluation: PACU  Patient participation: Yes- Able to Participate  Level of consciousness: awake and alert  Post-procedure vital signs: reviewed and stable  Pain management: adequate  Airway patency: patent    PONV status at discharge: No PONV  Anesthetic complications: no      Cardiovascular status: hemodynamically stable  Respiratory status: unassisted, spontaneous ventilation and room air  Hydration status: euvolemic  Follow-up not needed.              Vitals Value Taken Time   /71 08/01/25 13:50   Temp 36.6 °C (97.8 °F) 08/01/25 13:50   Pulse 71 08/01/25 13:50   Resp 16 08/01/25 13:50   SpO2 97 % 08/01/25 13:50         Event Time   Out of Recovery 12:04:22         Pain/Sima Score: Pain Rating Prior to Med Admin: 6 (8/1/2025 11:45 AM)  Sima Score: 10 (8/1/2025 12:00 PM)

## 2025-08-01 NOTE — TRANSFER OF CARE
Anesthesia Transfer of Care Note    Patient: Grupo Givens    Procedure(s) Performed: Procedure(s) (LRB):  CHOLECYSTECTOMY, LAPAROSCOPIC (N/A)    Patient location: PACU    Anesthesia Type: general    Transport from OR: Transported from OR on room air with adequate spontaneous ventilation    Post pain: adequate analgesia    Post assessment: no apparent anesthetic complications    Post vital signs: stable    Level of consciousness: awake    Nausea/Vomiting: no nausea/vomiting    Complications: none    Transfer of care protocol was followed    Last vitals: Visit Vitals  /69 (BP Location: Right arm, Patient Position: Lying)   Pulse 61   Temp 36.6 °C (97.8 °F) (Oral)   Resp 17   SpO2 100%

## 2025-08-20 ENCOUNTER — OFFICE VISIT (OUTPATIENT)
Dept: SURGERY | Facility: CLINIC | Age: 65
End: 2025-08-20
Payer: MEDICARE

## 2025-08-20 VITALS — SYSTOLIC BLOOD PRESSURE: 122 MMHG | DIASTOLIC BLOOD PRESSURE: 77 MMHG | TEMPERATURE: 98 F | HEART RATE: 66 BPM

## 2025-08-20 DIAGNOSIS — K85.10 GALLSTONE PANCREATITIS: Primary | ICD-10-CM

## 2025-08-20 PROCEDURE — 99999 PR PBB SHADOW E&M-EST. PATIENT-LVL III: CPT | Mod: PBBFAC,HCNC,, | Performed by: SURGERY

## 2025-08-20 PROCEDURE — 1126F AMNT PAIN NOTED NONE PRSNT: CPT | Mod: CPTII,HCNC,S$GLB, | Performed by: SURGERY

## 2025-08-20 PROCEDURE — 1159F MED LIST DOCD IN RCRD: CPT | Mod: CPTII,HCNC,S$GLB, | Performed by: SURGERY

## 2025-08-20 PROCEDURE — 3078F DIAST BP <80 MM HG: CPT | Mod: CPTII,HCNC,S$GLB, | Performed by: SURGERY

## 2025-08-20 PROCEDURE — 3074F SYST BP LT 130 MM HG: CPT | Mod: CPTII,HCNC,S$GLB, | Performed by: SURGERY

## 2025-08-20 PROCEDURE — 99024 POSTOP FOLLOW-UP VISIT: CPT | Mod: HCNC,S$GLB,, | Performed by: SURGERY

## 2025-08-20 PROCEDURE — 3044F HG A1C LEVEL LT 7.0%: CPT | Mod: CPTII,HCNC,S$GLB, | Performed by: SURGERY

## 2025-08-20 PROCEDURE — 1160F RVW MEDS BY RX/DR IN RCRD: CPT | Mod: CPTII,HCNC,S$GLB, | Performed by: SURGERY

## (undated) DEVICE — APPLICATOR SURGICEL ENDOSCOPIC

## (undated) DEVICE — SCISSOR 5MMX35CM DIRECT DRIVE

## (undated) DEVICE — TROCAR KII FIOS ZTHREAD 12X100

## (undated) DEVICE — TROCAR ENDO Z THREAD KII 5X100

## (undated) DEVICE — SUT MCRYL PLUS 4-0 PS2 27IN

## (undated) DEVICE — SOL NACL IRR 3000ML

## (undated) DEVICE — NDL ECLIPSE SAFETY 23G 1.5IN

## (undated) DEVICE — IRRIGATOR SUCTION W/TIP

## (undated) DEVICE — NDL PNEUMO INSUFFLATI 120MM

## (undated) DEVICE — DISSECTOR KITTNER 5MM T 45CM S

## (undated) DEVICE — CANNULA LAP SEAL Z THRD 5X100

## (undated) DEVICE — ELECTRODE MEGADYNE RETURN DUAL

## (undated) DEVICE — SOL NACL IRR 1000ML BTL

## (undated) DEVICE — CLIP ENDO LIGATION LARGE CLIPS

## (undated) DEVICE — ELECTRODE L HOOK 13IN 33M

## (undated) DEVICE — ADHESIVE DERMABOND ADVANCED

## (undated) DEVICE — KIT PROCEDURE STER INLET CLOSU

## (undated) DEVICE — Device

## (undated) DEVICE — GLOVE SENSICARE PI MICRO 7.5

## (undated) DEVICE — CORD MPLR STR PLUG DISP 10FT

## (undated) DEVICE — GLOVE SENSICARE PI GRN 8

## (undated) DEVICE — SYS SEE SHARP SCP ANTIFG LNG

## (undated) DEVICE — SUT VICRYL+ 27 UR-6 VIOL

## (undated) DEVICE — COVER CAMERA OPERATING ROOM

## (undated) DEVICE — SET TUBE PNEUMOCLEAR SE HI FLO

## (undated) DEVICE — COVER LIGHT HANDLE 80/CA

## (undated) DEVICE — APPLIER CLIP ENDO LIGAMAX 5MM

## (undated) DEVICE — BAG TISS RETRV MONARCH 10MM

## (undated) DEVICE — HEMOSTAT SURGICEL PWD 3G